# Patient Record
Sex: FEMALE | Race: OTHER | HISPANIC OR LATINO | URBAN - METROPOLITAN AREA
[De-identification: names, ages, dates, MRNs, and addresses within clinical notes are randomized per-mention and may not be internally consistent; named-entity substitution may affect disease eponyms.]

---

## 2023-08-17 RX ORDER — CEPHALEXIN 500 MG
1 CAPSULE ORAL
Refills: 0 | DISCHARGE
Start: 2023-08-17

## 2023-08-18 ENCOUNTER — INPATIENT (INPATIENT)
Facility: HOSPITAL | Age: 48
LOS: 3 days | Discharge: HOME | DRG: 812 | End: 2023-08-22
Attending: INTERNAL MEDICINE | Admitting: INTERNAL MEDICINE
Payer: MEDICAID

## 2023-08-18 VITALS
DIASTOLIC BLOOD PRESSURE: 84 MMHG | SYSTOLIC BLOOD PRESSURE: 146 MMHG | HEART RATE: 75 BPM | OXYGEN SATURATION: 97 % | TEMPERATURE: 99 F | RESPIRATION RATE: 20 BRPM

## 2023-08-18 DIAGNOSIS — D64.9 ANEMIA, UNSPECIFIED: ICD-10-CM

## 2023-08-18 LAB
ALBUMIN SERPL ELPH-MCNC: 4.4 G/DL — SIGNIFICANT CHANGE UP (ref 3.3–5)
ALP SERPL-CCNC: 53 U/L — SIGNIFICANT CHANGE UP (ref 40–120)
ALT FLD-CCNC: 9 U/L — LOW (ref 10–45)
ANION GAP SERPL CALC-SCNC: 13 MMOL/L — SIGNIFICANT CHANGE UP (ref 5–17)
APTT BLD: 26.7 SEC — SIGNIFICANT CHANGE UP (ref 24.5–35.6)
AST SERPL-CCNC: 10 U/L — SIGNIFICANT CHANGE UP (ref 10–40)
BASOPHILS # BLD AUTO: 0 K/UL — SIGNIFICANT CHANGE UP (ref 0–0.2)
BASOPHILS NFR BLD AUTO: 0 % — SIGNIFICANT CHANGE UP (ref 0–2)
BILIRUB SERPL-MCNC: 0.2 MG/DL — SIGNIFICANT CHANGE UP (ref 0.2–1.2)
BLD GP AB SCN SERPL QL: NEGATIVE — SIGNIFICANT CHANGE UP
BUN SERPL-MCNC: 8 MG/DL — SIGNIFICANT CHANGE UP (ref 7–23)
CALCIUM SERPL-MCNC: 8.9 MG/DL — SIGNIFICANT CHANGE UP (ref 8.4–10.5)
CHLORIDE SERPL-SCNC: 105 MMOL/L — SIGNIFICANT CHANGE UP (ref 96–108)
CO2 SERPL-SCNC: 22 MMOL/L — SIGNIFICANT CHANGE UP (ref 22–31)
CREAT SERPL-MCNC: 0.54 MG/DL — SIGNIFICANT CHANGE UP (ref 0.5–1.3)
EGFR: 115 ML/MIN/1.73M2 — SIGNIFICANT CHANGE UP
EOSINOPHIL # BLD AUTO: 0.07 K/UL — SIGNIFICANT CHANGE UP (ref 0–0.5)
EOSINOPHIL NFR BLD AUTO: 0.8 % — SIGNIFICANT CHANGE UP (ref 0–6)
GAS PNL BLDV: SIGNIFICANT CHANGE UP
GLUCOSE SERPL-MCNC: 96 MG/DL — SIGNIFICANT CHANGE UP (ref 70–99)
HCG SERPL-ACNC: <2 MIU/ML — SIGNIFICANT CHANGE UP
HCT VFR BLD CALC: 18.5 % — CRITICAL LOW (ref 34.5–45)
HCT VFR BLD CALC: 21.6 % — LOW (ref 34.5–45)
HGB BLD-MCNC: 4.6 G/DL — CRITICAL LOW (ref 11.5–15.5)
HGB BLD-MCNC: 5.9 G/DL — CRITICAL LOW (ref 11.5–15.5)
INR BLD: 1.05 RATIO — SIGNIFICANT CHANGE UP (ref 0.85–1.18)
LDH SERPL L TO P-CCNC: 196 U/L — SIGNIFICANT CHANGE UP (ref 50–242)
LYMPHOCYTES # BLD AUTO: 2.12 K/UL — SIGNIFICANT CHANGE UP (ref 1–3.3)
LYMPHOCYTES # BLD AUTO: 23.4 % — SIGNIFICANT CHANGE UP (ref 13–44)
MCHC RBC-ENTMCNC: 14 PG — LOW (ref 27–34)
MCHC RBC-ENTMCNC: 16.6 PG — LOW (ref 27–34)
MCHC RBC-ENTMCNC: 24.9 GM/DL — LOW (ref 32–36)
MCHC RBC-ENTMCNC: 27.3 GM/DL — LOW (ref 32–36)
MCV RBC AUTO: 56.4 FL — LOW (ref 80–100)
MCV RBC AUTO: 60.7 FL — LOW (ref 80–100)
MONOCYTES # BLD AUTO: 0.53 K/UL — SIGNIFICANT CHANGE UP (ref 0–0.9)
MONOCYTES NFR BLD AUTO: 5.8 % — SIGNIFICANT CHANGE UP (ref 2–14)
NEUTROPHILS # BLD AUTO: 6.34 K/UL — SIGNIFICANT CHANGE UP (ref 1.8–7.4)
NEUTROPHILS NFR BLD AUTO: 70 % — SIGNIFICANT CHANGE UP (ref 43–77)
NRBC # BLD: 0 /100 WBCS — SIGNIFICANT CHANGE UP (ref 0–0)
NT-PROBNP SERPL-SCNC: 58 PG/ML — SIGNIFICANT CHANGE UP (ref 0–300)
OB PNL STL: NEGATIVE — SIGNIFICANT CHANGE UP
PLATELET # BLD AUTO: 197 K/UL — SIGNIFICANT CHANGE UP (ref 150–400)
PLATELET # BLD AUTO: 267 K/UL — SIGNIFICANT CHANGE UP (ref 150–400)
POTASSIUM SERPL-MCNC: 4.2 MMOL/L — SIGNIFICANT CHANGE UP (ref 3.5–5.3)
POTASSIUM SERPL-SCNC: 4.2 MMOL/L — SIGNIFICANT CHANGE UP (ref 3.5–5.3)
PROT SERPL-MCNC: 7.6 G/DL — SIGNIFICANT CHANGE UP (ref 6–8.3)
PROTHROM AB SERPL-ACNC: 11 SEC — SIGNIFICANT CHANGE UP (ref 9.5–13)
RBC # BLD: 3.28 M/UL — LOW (ref 3.8–5.2)
RBC # BLD: 3.43 M/UL — LOW (ref 3.8–5.2)
RBC # BLD: 3.56 M/UL — LOW (ref 3.8–5.2)
RBC # FLD: 21.4 % — HIGH (ref 10.3–14.5)
RBC # FLD: 26.8 % — HIGH (ref 10.3–14.5)
RETICS #: 39.1 K/UL — SIGNIFICANT CHANGE UP (ref 25–125)
RETICS/RBC NFR: 1.1 % — SIGNIFICANT CHANGE UP (ref 0.5–2.5)
RH IG SCN BLD-IMP: POSITIVE — SIGNIFICANT CHANGE UP
SODIUM SERPL-SCNC: 140 MMOL/L — SIGNIFICANT CHANGE UP (ref 135–145)
TROPONIN T, HIGH SENSITIVITY RESULT: <6 NG/L — SIGNIFICANT CHANGE UP (ref 0–51)
WBC # BLD: 7.7 K/UL — SIGNIFICANT CHANGE UP (ref 3.8–10.5)
WBC # BLD: 9.06 K/UL — SIGNIFICANT CHANGE UP (ref 3.8–10.5)
WBC # FLD AUTO: 7.7 K/UL — SIGNIFICANT CHANGE UP (ref 3.8–10.5)
WBC # FLD AUTO: 9.06 K/UL — SIGNIFICANT CHANGE UP (ref 3.8–10.5)

## 2023-08-18 PROCEDURE — 71045 X-RAY EXAM CHEST 1 VIEW: CPT | Mod: 26

## 2023-08-18 PROCEDURE — 99232 SBSQ HOSP IP/OBS MODERATE 35: CPT

## 2023-08-18 PROCEDURE — 86078 PHYS BLOOD BANK SERV REACTJ: CPT

## 2023-08-18 PROCEDURE — 99254 IP/OBS CNSLTJ NEW/EST MOD 60: CPT | Mod: GC

## 2023-08-18 PROCEDURE — 99291 CRITICAL CARE FIRST HOUR: CPT

## 2023-08-18 RX ORDER — SENNA PLUS 8.6 MG/1
2 TABLET ORAL AT BEDTIME
Refills: 0 | Status: DISCONTINUED | OUTPATIENT
Start: 2023-08-18 | End: 2023-08-22

## 2023-08-18 RX ORDER — POLYETHYLENE GLYCOL 3350 17 G/17G
17 POWDER, FOR SOLUTION ORAL
Refills: 0 | Status: DISCONTINUED | OUTPATIENT
Start: 2023-08-18 | End: 2023-08-22

## 2023-08-18 RX ORDER — NAFCILLIN 10 G/100ML
1000 INJECTION, POWDER, FOR SOLUTION INTRAVENOUS ONCE
Refills: 0 | Status: COMPLETED | OUTPATIENT
Start: 2023-08-18 | End: 2023-08-18

## 2023-08-18 RX ORDER — NAFCILLIN 10 G/100ML
INJECTION, POWDER, FOR SOLUTION INTRAVENOUS
Refills: 0 | Status: DISCONTINUED | OUTPATIENT
Start: 2023-08-18 | End: 2023-08-19

## 2023-08-18 RX ORDER — NAFCILLIN 10 G/100ML
1000 INJECTION, POWDER, FOR SOLUTION INTRAVENOUS EVERY 6 HOURS
Refills: 0 | Status: DISCONTINUED | OUTPATIENT
Start: 2023-08-19 | End: 2023-08-19

## 2023-08-18 RX ORDER — ACETAMINOPHEN 500 MG
1000 TABLET ORAL ONCE
Refills: 0 | Status: COMPLETED | OUTPATIENT
Start: 2023-08-18 | End: 2023-08-18

## 2023-08-18 RX ADMIN — Medication 400 MILLIGRAM(S): at 21:03

## 2023-08-18 RX ADMIN — NAFCILLIN 100 MILLIGRAM(S): 10 INJECTION, POWDER, FOR SOLUTION INTRAVENOUS at 23:02

## 2023-08-18 NOTE — ED ADULT NURSE NOTE - CAS EDP DISCH DISPOSITION ADMI
[facility] - [facility]

## 2023-08-18 NOTE — CONSULT NOTE ADULT - ATTENDING COMMENTS
Anemia  Doubt but cannot rule out occult gi bleeding  Additional blood testing as ordered  Consideration for EGD colonoscopy, inpatient vs outpatient   Will followup

## 2023-08-18 NOTE — H&P ADULT - ASSESSMENT
Anemia Transfuse prn   GI consult appreciated   Anemia work up   Hem Eval        F with PMH chronic anemia (dx at age 14; on iron supplements), chronic intermittent constipation, gastric sleeve (done 2020) who presents to the ED after being referred to the ED for acute worsening of chronic anemia.         Anemia Transfuse prn   GI consult appreciated   Anemia work up   vit B12, Folate  levels   Hem Eval     Follicultiits Gyn consulted   recommend Dicloxacillin 500mg QID x7 days

## 2023-08-18 NOTE — ED PROVIDER NOTE - OBJECTIVE STATEMENT
Patient is a 48 y/o woman with s/p gastric sleeve bariatric surgery 3 years ago, with a history of anemia, presenting with 1 month of fatigue, drowsiness, shortness of breath and found to have anemia at her PCP. She has been constipated for about a month, and has had very dark stool for the past few days. Her LMP was 7/31 and she noticed darker blood than usual, she changed her pad 4-5 times per day, which is normal for her. She additionally has a history of nausea and vomiting since her bariatric surgery, most recently yesterday when she vomited 3x, but she has not noticed blood. It takes her about 2 minutes to catch her breath when she feels short of breath, and it occurs when she is walking. She felt like she had a fever last night. She has baseline abdominal pain since the surgery. For the past week she has had a bump on her right labia majora, which has gotten red, painful, hard, and bigger over the past week. She saw her PCP yesterday and was given Keflex 500mg four times a day, which she took one dose of. She denies any recent falls, dizziness, lightheadedness, changes to vision, changes to hearing, chest pain, sick contacts, wheezing, changes to urination, and swelling in the legs.

## 2023-08-18 NOTE — PATIENT PROFILE ADULT - FUNCTIONAL ASSESSMENT - BASIC MOBILITY 6.
4-calculated by average/Not able to assess (calculate score using [facility] averaging method)  4-calculated by average/Not able to assess (calculate score using [facility] averaging method)  4-calculated by average/Not able to assess (calculate score using [facility] averaging method)

## 2023-08-18 NOTE — CHART NOTE - NSCHARTNOTEFT_GEN_A_CORE
46 y/o F with PMH chronic anemia (dx at age 14; on iron supplements), chronic intermittent constipation, gastric sleeve (done 2020) who presents to the ED after being referred to the ED for acute worsening of chronic anemia. Hgb 4.6 on arrival, s/p 1 U prbc.     Notified by RN, patient with rectal T 100.4F. Patient seen and examined, endorsing intermittent chills x few days did not check temperature. Otherwise VSS. Patient A&O x 4 denies CP, SOB, back pain, n/v or any other complaints at this time. Patient with vulvar folliculitis 48 y/o F with PMH chronic anemia (dx at age 14; on iron supplements), chronic intermittent constipation, gastric sleeve (done 2020) who presents to the ED after being referred to the ED for acute worsening of chronic anemia. Hgb 4.6 on arrival, s/p 1 U prbc.     Notified by RN, patient with rectal T 100.4F. Patient seen and examined, endorsing intermittent chills x few days did not check temperature. Otherwise VSS. Patient A&O x 4 denies CP, SOB, back pain, n/v or any other complaints at this time. Patient with vulvar folliculitis

## 2023-08-18 NOTE — ED PROVIDER NOTE - ATTENDING CONTRIBUTION TO CARE
This was a shared visit with the KIM. I reviewed and verified the documentation and independently performed the documented:     History, Exam and Medical Decision Making.     Attending Contribution to Care: I have personally provided the amount of critical care time documented below concurrently with the resident/fellow.  This time excludes time spent on separate procedures and time spent teaching. I have reviewed the resident’s / fellow’s documentation and I agree with the history, exam, and assessment and plan of care.     Critical Care Time Spent (min) Must be 30 or more minutes to qualify: 53.    46yo , LMP 2023 hx of gastric sleeve and known hx of anemia presents w/ 1 month of fatigue drowsiness, SOB, and was found to be anemic, pt states that she has a lesion on the R labia, she reports that she has had darker stool for a few days, no diarrhea, no brbpr, pt w/ no cp, but reports fatigue. no prior hx of blood transfusion, pt w/ no fevers, no chills.   On exam, pt is awake and alert in no distress,   used  Jean-Pierre [id number]  On exam, I have reviewed the triage vital signs.    Const: appearing stated age, no acute distress  Head: atraumatic, normocephalic  Eyes: no conjunctival injection and no scleral icterus, conjunctival pallor   ENMT: Atraumatic external nose and ears, Moist mucus membranes  Neck: Symmetric, trachea midline, no c spine tenderness  BACK: no bruising, no midline t/l spine tenderness  CVS: +S1/S2, dorsalis pedis/radial pulse 2+ bilaterally  RESP: Unlabored respiratory effort, Clear to auscultation bilaterally  GI: Nontender/Nondistended, soft abdomen  : R labia w/ a 1 cm well circumscribed lesions w/ no active drainage nor purulence, Chaperone Sabas   MSK: Extremities w/o deformity or ttp   Psych: Awake, Alert, & Orientedx3;  Appropriate mood and affect, cooperative  Plan for labs transfusion and admission for anemia workup and further management This was a shared visit with the KIM. I reviewed and verified the documentation and independently performed the documented:     History, Exam and Medical Decision Making.     Attending Contribution to Care: I have personally provided the amount of critical care time documented below concurrently with the resident/fellow.  This time excludes time spent on separate procedures and time spent teaching. I have reviewed the resident’s / fellow’s documentation and I agree with the history, exam, and assessment and plan of care.     Critical Care Time Spent (min) Must be 30 or more minutes to qualify: 53.    48yo , LMP 2023 hx of gastric sleeve and known hx of anemia presents w/ 1 month of fatigue drowsiness, SOB, and was found to be anemic, pt states that she has a lesion on the R labia, she reports that she has had darker stool for a few days, no diarrhea, no brbpr, pt w/ no cp, but reports fatigue. no prior hx of blood transfusion, pt w/ no fevers, no chills.   On exam, pt is awake and alert in no distress,   used  Jean-Pierre [id number]  On exam, I have reviewed the triage vital signs.    Const: appearing stated age, no acute distress  Head: atraumatic, normocephalic  Eyes: no conjunctival injection and no scleral icterus, conjunctival pallor   ENMT: Atraumatic external nose and ears, Moist mucus membranes  Neck: Symmetric, trachea midline, no c spine tenderness  BACK: no bruising, no midline t/l spine tenderness  CVS: +S1/S2, dorsalis pedis/radial pulse 2+ bilaterally  RESP: Unlabored respiratory effort, Clear to auscultation bilaterally  GI: Nontender/Nondistended, soft abdomen  : R labia w/ a 1 cm well circumscribed lesions w/ no active drainage nor purulence, Chaperone Sabas   MSK: Extremities w/o deformity or ttp   Psych: Awake, Alert, & Orientedx3;  Appropriate mood and affect, cooperative  Plan for labs transfusion and admission for anemia workup and further management This was a shared visit with the KIM. I reviewed and verified the documentation and independently performed the documented:     History, Exam and Medical Decision Making.     Attending Contribution to Care: I have personally provided the amount of critical care time documented below concurrently with the resident/fellow.  This time excludes time spent on separate procedures and time spent teaching. I have reviewed the resident’s / fellow’s documentation and I agree with the history, exam, and assessment and plan of care.     Critical Care Time Spent (min) Must be 30 or more minutes to qualify: 53.    46yo , LMP 2023 hx of gastric sleeve and known hx of anemia presents w/ 1 month of fatigue drowsiness, SOB, and was found to be anemic, pt states that she has a lesion on the R labia, she reports that she has had darker stool for a few days, no diarrhea, no brbpr, pt w/ no cp, but reports fatigue. no prior hx of blood transfusion, pt w/ no fevers, no chills.   On exam, pt is awake and alert in no distress,   used  Jean-Pierre [id number]  On exam, I have reviewed the triage vital signs.    Const: appearing stated age, no acute distress  Head: atraumatic, normocephalic  Eyes: no conjunctival injection and no scleral icterus, conjunctival pallor   ENMT: Atraumatic external nose and ears, Moist mucus membranes  Neck: Symmetric, trachea midline, no c spine tenderness  BACK: no bruising, no midline t/l spine tenderness  CVS: +S1/S2, dorsalis pedis/radial pulse 2+ bilaterally  RESP: Unlabored respiratory effort, Clear to auscultation bilaterally  GI: Nontender/Nondistended, soft abdomen  : R labia w/ a 1 cm well circumscribed lesions w/ no active drainage nor purulence, Chaperone Sabas   MSK: Extremities w/o deformity or ttp   Psych: Awake, Alert, & Orientedx3;  Appropriate mood and affect, cooperative  Plan for labs transfusion and admission for anemia workup and further management

## 2023-08-18 NOTE — H&P ADULT - HISTORY OF PRESENT ILLNESS
F with PMH chronic anemia (dx at age 14; on iron supplements), chronic intermittent constipation, gastric sleeve p/w  worsening of chronic anemia. Reports 1 month of fatigue, drowsiness, shortness of breath; had OP blood work done by PCP and was told to come to the ED for further eval. Report intermittent vomiting 2/2 known food triggers such as fruits, watermelon, fatty/greasy foods; last vomited yesterday after eating watermelon

## 2023-08-18 NOTE — ED PROVIDER NOTE - PHYSICAL EXAMINATION
T(C): 37.1 (08-18-23 @ 11:28), Max: 37.1 (08-18-23 @ 11:28)  HR: 75 (08-18-23 @ 11:28) (75 - 75)  BP: 146/84 (08-18-23 @ 11:28) (146/84 - 146/84)  RR: 20 (08-18-23 @ 11:28) (20 - 20)  SpO2: 97% (08-18-23 @ 11:28) (97% - 97%)    Constitutional: Sitting comfortably in bed, no apparent distress  Eyes: PERRLA and symmetric, EOMI, No conjunctival or scleral injection, non-icteric  HEENT: Atraumatic, normocephalic. Moist mucous membranes. Normal dentition, no pharyngeal injection or exudates.  Respiratory: Clear to auscultation bilaterally, no respiratory distress. No accessory muscle usage. No wheezes, rales or rhonchi.  Cardiovascular: Regular rate and rhythm, +S1 S2, no murmurs, rubs, or gallops. no JVD; no peripheral edema  Abdominal: +BS. Soft, tender to palpation in epigastric region, nondistended. No rebound tenderness, no guarding.   Extremities: 2+ peripheral pulses b/l, no peripheral edema, full range of motion.   Genitourinary: erythematous, firm, tender, 1 cm lump on the right labia.  Skin: No rashes or bruises.

## 2023-08-18 NOTE — ED PROVIDER NOTE - NS ED ROS FT
Constitutional: +Fatigue, +Fever. No chills, night sweats, or changes in weight.  HEENT: +Headache. No vision changes, no eye pain/redness/discharge; no hearing changes, tinnitus, vertigo; no rhinorrhea; no sore throat, no difficulty swallowing  Neck: No neck pain, stiffness, or swelling  Respiratory: +Shortness of breath. No cough, or wheezing  Cardiovascular: No chest pain or palpitations  Gastrointestinal: +Nausea, vomiting, constipation, dark stools, abdominal pain.   Genitourinary: +Red, painful, hard lump on right labia majora. No dysuria, urinary frequency, hematuria, or incontinence  Extremities: No leg swelling  Skin: No rashes

## 2023-08-18 NOTE — CONSULT NOTE ADULT - ASSESSMENT
48yo , LMP 2023, who presents to the ED after being referred to the ED by her PMD for anemia of unclear etiology (no reported hx of heavy vaginal bleeding). GYN consulted for likely folliculitis. Patient additionally afebrile w/o any leukocytosis.     Recommendation is as follows:  - Sitz baths TID for w/ each session ~20min  - Discontinue Cephalexin, start Dicloxacillin 500mg QID x7 days   - Discontinue shaving of vulva     José Miguel Noel  PGY-2, Obstetrics & Gynecology     d/w Dr. PRITESH Johnson     46yo , LMP 2023, who presents to the ED after being referred to the ED by her PMD for anemia of unclear etiology (no reported hx of heavy vaginal bleeding). GYN consulted for likely folliculitis. Patient additionally afebrile w/o any leukocytosis.     Recommendation is as follows:  - Sitz baths TID for w/ each session ~20min  - Discontinue Cephalexin, start Dicloxacillin 500mg QID x7 days   - Discontinue shaving of vulva     José Miguel Noel  PGY-2, Obstetrics & Gynecology     d/w Dr. PRITESH Johnson

## 2023-08-18 NOTE — ED ADULT NURSE NOTE - FINAL NURSING ELECTRONIC SIGNATURE
Isotretinoin Pregnancy And Lactation Text: This medication is Pregnancy Category X and is considered extremely dangerous during pregnancy. It is unknown if it is excreted in breast milk. 18-Aug-2023 21:03

## 2023-08-18 NOTE — CHART NOTE - NSCHARTNOTEFT_GEN_A_CORE
MEDICINE NP    CLIFF BLUM  47y Female    Patient is a 47y old  Female who presents with a chief complaint of Anemia (18 Aug 2023 16:27)         > Event Summary:   Notified by RN, Patient c/o feverish fever T-100.4 rectally.     RN reports patient due for 2nd Unit of PRBC and c/o feverish and chills, oral temp obtained T-99.9, repeated rectally T-100.4.  Patient seen at bedside w/ use of Language  # [id number] (Mohawk).  Patient AOX4, reports feverish and chills from prior to hospitalization x 1 week with noted Right vulva "lump";  states she did not check temperature.    Patient denies sob, chest pain, rash, itching, palpitations, back pain.    Exam: Lungs CTA, S1,S2 RRR.  Right vulva w/ fluctuant closed nodule.  No rash or hives.          -Vital Signs Last 24 Hrs  T(C): 37.3 (18 Aug 2023 21:34), Max: 38 (18 Aug 2023 20:29)  T(F): 99.2 (18 Aug 2023 21:34), Max: 100.4 (18 Aug 2023 20:29)  HR: 89 (18 Aug 2023 21:34) (59 - 92)  BP: 132/77 (18 Aug 2023 21:34) (111/69 - 146/84)  BP(mean): 98 (18 Aug 2023 16:27) (98 - 98)  RR: 18 (18 Aug 2023 21:34) (18 - 20)  SpO2: 100% (18 Aug 2023 21:34) (97% - 100%)    Parameters below as of 18 Aug 2023 21:34  Patient On (Oxygen Delivery Method): room air        > Assess & Plan:  HPI:  46 y/o woman with s/p Gastric sleeve Bariatric surgery 3 years ago, with a history of Anemia  (dx at age 14; on iron supplements), , presenting with 1 month of fatigue, drowsiness, shortness of breath and found to have anemia at her PCP and sent to the ED.  Additionally she felt like she had a fever last night, and for the past week she has had a bump on her right labia majora, which has gotten red, painful, hard, and bigger over the past week. She saw her PCP yesterday and was given Keflex.  Admitted with Symptomatic Anemia.  Now with Fever.    1. Fever -r/o Post-transfusion reaction                EKATERINA Zhou-BC  Medicine Department MEDICINE NP    CLIFF BLUM  47y Female    Patient is a 47y old  Female who presents with a chief complaint of Anemia (18 Aug 2023 16:27)         > Event Summary:   Notified by RN, Patient c/o feverish fever T-100.4 rectally.     RN reports patient due for 2nd Unit of PRBC and c/o feverish and chills, oral temp obtained T-99.9, repeated rectally T-100.4.  Patient seen at bedside w/ use of Language  # [id number] (Bengali).  Patient AOX4, reports feverish and chills from prior to hospitalization x 1 week with noted Right vulva "lump";  states she did not check temperature.    Patient denies sob, chest pain, rash, itching, palpitations, back pain.    Exam: Lungs CTA, S1,S2 RRR.  Right vulva w/ fluctuant closed nodule.  No rash or hives.          -Vital Signs Last 24 Hrs  T(C): 37.3 (18 Aug 2023 21:34), Max: 38 (18 Aug 2023 20:29)  T(F): 99.2 (18 Aug 2023 21:34), Max: 100.4 (18 Aug 2023 20:29)  HR: 89 (18 Aug 2023 21:34) (59 - 92)  BP: 132/77 (18 Aug 2023 21:34) (111/69 - 146/84)  BP(mean): 98 (18 Aug 2023 16:27) (98 - 98)  RR: 18 (18 Aug 2023 21:34) (18 - 20)  SpO2: 100% (18 Aug 2023 21:34) (97% - 100%)    Parameters below as of 18 Aug 2023 21:34  Patient On (Oxygen Delivery Method): room air        > Assess & Plan:  HPI:  46 y/o woman with s/p Gastric sleeve Bariatric surgery 3 years ago, with a history of Anemia  (dx at age 14; on iron supplements), , presenting with 1 month of fatigue, drowsiness, shortness of breath and found to have anemia at her PCP and sent to the ED.  Additionally she felt like she had a fever last night, and for the past week she has had a bump on her right labia majora, which has gotten red, painful, hard, and bigger over the past week. She saw her PCP yesterday and was given Keflex.  Admitted with Symptomatic Anemia.  Now with Fever.    1. Fever -r/o Post-transfusion reaction                EKATERINA Zhou-BC  Medicine Department MEDICINE NP    CLIFF BLUM  47y Female    Patient is a 47y old  Female who presents with a chief complaint of Anemia (18 Aug 2023 16:27)         > Event Summary:   Notified by RN, Patient c/o feverish fever T-100.4 rectally.     RN reports patient due for 2nd Unit of PRBC and c/o feverish and chills, oral temp obtained T-99.9, repeated rectally T-100.4.  Patient seen at bedside w/ use of Language  # [id number] (Kazakh).  Patient AOX4, reports feverish and chills from prior to hospitalization x 1 week with noted Right vulva "lump";  states she did not check temperature.    Patient denies sob, chest pain, rash, itching, palpitations, back pain.    Exam: Lungs CTA, S1,S2 RRR.  Right vulva w/ fluctuant closed nodule.  No rash or hives.          -Vital Signs Last 24 Hrs  T(C): 37.3 (18 Aug 2023 21:34), Max: 38 (18 Aug 2023 20:29)  T(F): 99.2 (18 Aug 2023 21:34), Max: 100.4 (18 Aug 2023 20:29)  HR: 89 (18 Aug 2023 21:34) (59 - 92)  BP: 132/77 (18 Aug 2023 21:34) (111/69 - 146/84)  BP(mean): 98 (18 Aug 2023 16:27) (98 - 98)  RR: 18 (18 Aug 2023 21:34) (18 - 20)  SpO2: 100% (18 Aug 2023 21:34) (97% - 100%)    Parameters below as of 18 Aug 2023 21:34  Patient On (Oxygen Delivery Method): room air        > Assess & Plan:  HPI:  46 y/o woman with s/p Gastric sleeve Bariatric surgery 3 years ago, with a history of Anemia  (dx at age 14; on iron supplements), , presenting with 1 month of fatigue, drowsiness, shortness of breath and found to have anemia at her PCP and sent to the ED.  Additionally she felt like she had a fever last night, and for the past week she has had a bump on her right labia majora, which has gotten red, painful, hard, and bigger over the past week. She saw her PCP yesterday and was given Keflex.  Admitted with Symptomatic Anemia.  Now with Fever.    1. Fever -r/o Post-transfusion reaction                EKATERINA Zhou-BC  Medicine Department MEDICINE NP    CLIFF BLUM  47y Female    Patient is a 47y old  Female who presents with a chief complaint of Anemia (18 Aug 2023 16:27)         > Event Summary:   Notified by RN, Patient c/o feverish fever T-100.4 rectally.     RN reports patient due for 2nd Unit of PRBC and c/o feverish and chills, oral temp obtained T-99.9, repeated rectally T-100.4.  Patient seen at bedside w/ use of Language  # [id number] (Occitan).  Patient AOX4, reports feverish and chills from prior to hospitalization x 1 week with noted Right vulva "lump";  states she did not check temperature.    Patient denies sob, chest pain, rash, itching, palpitations, back pain.    Exam: Lungs CTA, S1,S2 RRR.  Right vulva w/ fluctuant closed nodule.  No rash or hives.          -Vital Signs Last 24 Hrs  T(C): 37.3 (18 Aug 2023 21:34), Max: 38 (18 Aug 2023 20:29)  T(F): 99.2 (18 Aug 2023 21:34), Max: 100.4 (18 Aug 2023 20:29)  HR: 89 (18 Aug 2023 21:34) (59 - 92)  BP: 132/77 (18 Aug 2023 21:34) (111/69 - 146/84)  BP(mean): 98 (18 Aug 2023 16:27) (98 - 98)  RR: 18 (18 Aug 2023 21:34) (18 - 20)  SpO2: 100% (18 Aug 2023 21:34) (97% - 100%)    Parameters below as of 18 Aug 2023 21:34  Patient On (Oxygen Delivery Method): room air        > Assess & Plan:  HPI:  48 y/o woman with s/p Gastric sleeve Bariatric surgery 3 years ago, with a history of Anemia  (dx at age 14; on iron supplements), , presenting with 1 month of fatigue, drowsiness, shortness of breath and found to have anemia at her PCP and sent to the ED.  Additionally she felt like she had a fever last night, and for the past week she has had a bump on her right labia majora, which has gotten red, painful, hard, and bigger over the past week. She saw her PCP yesterday and was given Keflex.  Admitted with Symptomatic Anemia.  Now with Fever.    1. Fever - Concern for ?Post-transfusion reaction vs Infectious Process  -Post-transfusion reaction given >1* degree Celsius change in temp - Will send CBC, Type & Screen  -Infectious process likely from vulva -Nafcillin QID ordered per GYN   -Tylenol /Cooling measures prn  -BCX x2, UA ordered  -CXR- neg    2. Anemia  -F/u STAT CBC and T&S above  -Transfuse 2nd Unit when available  -Maintain hemoglobin >/= 7.0 per GI   -GI following  -Heme consulted       -D/w Dr. Blackwell, req CTAP per GI.    -Endorsed to floor NP       Yudi Carrion, EKATERINA-BC  Medicine Department  #[id number] MEDICINE NP    CLIFF BLUM  47y Female    Patient is a 47y old  Female who presents with a chief complaint of Anemia (18 Aug 2023 16:27)         > Event Summary:   Notified by RN, Patient c/o feverish fever T-100.4 rectally.     RN reports patient due for 2nd Unit of PRBC and c/o feverish and chills, oral temp obtained T-99.9, repeated rectally T-100.4.  Patient seen at bedside w/ use of Language  # [id number] (Uzbek).  Patient AOX4, reports feverish and chills from prior to hospitalization x 1 week with noted Right vulva "lump";  states she did not check temperature.    Patient denies sob, chest pain, rash, itching, palpitations, back pain.    Exam: Lungs CTA, S1,S2 RRR.  Right vulva w/ fluctuant closed nodule.  No rash or hives.          -Vital Signs Last 24 Hrs  T(C): 37.3 (18 Aug 2023 21:34), Max: 38 (18 Aug 2023 20:29)  T(F): 99.2 (18 Aug 2023 21:34), Max: 100.4 (18 Aug 2023 20:29)  HR: 89 (18 Aug 2023 21:34) (59 - 92)  BP: 132/77 (18 Aug 2023 21:34) (111/69 - 146/84)  BP(mean): 98 (18 Aug 2023 16:27) (98 - 98)  RR: 18 (18 Aug 2023 21:34) (18 - 20)  SpO2: 100% (18 Aug 2023 21:34) (97% - 100%)    Parameters below as of 18 Aug 2023 21:34  Patient On (Oxygen Delivery Method): room air        > Assess & Plan:  HPI:  48 y/o woman with s/p Gastric sleeve Bariatric surgery 3 years ago, with a history of Anemia  (dx at age 14; on iron supplements), , presenting with 1 month of fatigue, drowsiness, shortness of breath and found to have anemia at her PCP and sent to the ED.  Additionally she felt like she had a fever last night, and for the past week she has had a bump on her right labia majora, which has gotten red, painful, hard, and bigger over the past week. She saw her PCP yesterday and was given Keflex.  Admitted with Symptomatic Anemia.  Now with Fever.    1. Fever - Concern for ?Post-transfusion reaction vs Infectious Process  -Post-transfusion reaction given >1* degree Celsius change in temp - Will send CBC, Type & Screen  -Infectious process likely from vulva -Nafcillin QID ordered per GYN   -Tylenol /Cooling measures prn  -BCX x2, UA ordered  -CXR- neg    2. Anemia  -F/u STAT CBC and T&S above  -Transfuse 2nd Unit when available  -Maintain hemoglobin >/= 7.0 per GI   -GI following  -Heme consulted       -D/w Dr. Blackwell, req CTAP per GI.    -Endorsed to floor NP       Yudi Carrion, EKATERINA-BC  Medicine Department  #[id number] MEDICINE NP    CLIFF BLUM  47y Female    Patient is a 47y old  Female who presents with a chief complaint of Anemia (18 Aug 2023 16:27)         > Event Summary:   Notified by RN, Patient c/o feverish fever T-100.4 rectally.     RN reports patient due for 2nd Unit of PRBC and c/o feverish and chills, oral temp obtained T-99.9, repeated rectally T-100.4.  Patient seen at bedside w/ use of Language  # [id number] (Uzbek).  Patient AOX4, reports feverish and chills from prior to hospitalization x 1 week with noted Right vulva "lump";  states she did not check temperature.    Patient denies sob, chest pain, rash, itching, palpitations, back pain.    Exam: Lungs CTA, S1,S2 RRR.  Right vulva w/ fluctuant closed nodule.  No rash or hives.          -Vital Signs Last 24 Hrs  T(C): 37.3 (18 Aug 2023 21:34), Max: 38 (18 Aug 2023 20:29)  T(F): 99.2 (18 Aug 2023 21:34), Max: 100.4 (18 Aug 2023 20:29)  HR: 89 (18 Aug 2023 21:34) (59 - 92)  BP: 132/77 (18 Aug 2023 21:34) (111/69 - 146/84)  BP(mean): 98 (18 Aug 2023 16:27) (98 - 98)  RR: 18 (18 Aug 2023 21:34) (18 - 20)  SpO2: 100% (18 Aug 2023 21:34) (97% - 100%)    Parameters below as of 18 Aug 2023 21:34  Patient On (Oxygen Delivery Method): room air        > Assess & Plan:  HPI:  46 y/o woman with s/p Gastric sleeve Bariatric surgery 3 years ago, with a history of Anemia  (dx at age 14; on iron supplements), , presenting with 1 month of fatigue, drowsiness, shortness of breath and found to have anemia at her PCP and sent to the ED.  Additionally she felt like she had a fever last night, and for the past week she has had a bump on her right labia majora, which has gotten red, painful, hard, and bigger over the past week. She saw her PCP yesterday and was given Keflex.  Admitted with Symptomatic Anemia.  Now with Fever.    1. Fever - Concern for ?Post-transfusion reaction vs Infectious Process  -Post-transfusion reaction given >1* degree Celsius change in temp - Will send CBC, Type & Screen  -Infectious process likely from vulva -Nafcillin QID ordered per GYN   -Tylenol /Cooling measures prn  -BCX x2, UA ordered  -CXR- neg    2. Anemia  -F/u STAT CBC and T&S above  -Transfuse 2nd Unit when available  -Maintain hemoglobin >/= 7.0 per GI   -GI following  -Heme consulted       -D/w Dr. Blackwell, req CTAP per GI.    -Endorsed to floor NP       Yudi Carrion, EKATERINA-BC  Medicine Department  #[id number]

## 2023-08-18 NOTE — ED ADULT NURSE NOTE - OBJECTIVE STATEMENT
48 yo F pt PMHx of Anemia unknown cause p/w low HGB outpatient. 46 yo F pt PMHx of Anemia unknown cause p/w low HGB outpatient. 46 yo F pt PMHx gastric sleeve 3 years ago, Anemia unknown cause p/w low HGB outpatient and 1 month of fatigue, SOB. pt endorsing constipation xs 1 month with dark stool past few days and N/V yesterday NBNB. LMP 7/31 with darker blood than usual changed pad 3-4 times a day normal for her.  pt is A&Ox4, MAEW, abd soft non tender, skin warm dry intact/normal for race.  Pt denies headache, dizziness, chest pain, palpitations, cough, SOB, abdominal pain, n/v/d, urinary symptoms, fevers, chills, weakness at this time. 48 yo F pt PMHx gastric sleeve 3 years ago, Anemia unknown cause p/w low HGB outpatient and 1 month of fatigue, SOB. pt endorsing constipation xs 1 month with dark stool past few days and N/V yesterday NBNB. LMP 7/31 with darker blood than usual changed pad 3-4 times a day normal for her.  pt is A&Ox4, MAEW, abd soft non tender, skin warm dry intact/normal for race.  Pt denies headache, dizziness, chest pain, palpitations, cough, SOB, abdominal pain, n/v/d, urinary symptoms, fevers, chills, weakness at this time.

## 2023-08-18 NOTE — CONSULT NOTE ADULT - SUBJECTIVE AND OBJECTIVE BOX
CLIFF COLE  47y  Female [id number]  - # [id number], [place]    HPI: 48yo , LMP 2023, who presents to the ED after being referred to the ED for anemia found by her PCP of unclear etiology. GYN consulted for vulvar lesion of x1 week duration. Reports developing this bump x1 week ago that is painful. Was seen by her PMD x1 day ago for which she was started on Cephalexin 500mg for which she has taken x1 dose thus far. Has tried hot packs but not sitz baths. Denies any heavy or significant vaginal bleeding (denies a hx of heavy periods), fevers, or abnormal vaginal discharge. Of note, patient reports regularly shaving her vulva q3 days    Name of GYN Physician: Does not have one     POB:  x1 (), ToPx1    PGyn: Reports a having had a polypectomy at a clinic somewhere in [place]   - Denies any other GYN hx   MedHx: Denies  SurgHx: Sleeve gastrectomy  Meds: Vitamins  Allergies: NKDA  Social: Denies any tobacco use, drug use, or alcohol use       Vital Signs Last 24 Hrs  T(C): 37.1 (18 Aug 2023 11:28), Max: 37.1 (18 Aug 2023 11:28)  T(F): 98.8 (18 Aug 2023 11:28), Max: 98.8 (18 Aug 2023 11:28)  HR: 75 (18 Aug 2023 11:28) (75 - 75)  BP: 146/84 (18 Aug 2023 11:28) (146/84 - 146/84)  BP(mean): --  RR: 20 (18 Aug 2023 11:28) (20 - 20)  SpO2: 97% (18 Aug 2023 11:28) (97% - 97%)      Physical Exam:   General: Awake. Alert. NAD  Lungs: Unlabored breathing. No respiratory distress   Abd: Soft, non-tender, non-distended   (w/ ED tech chaperone Geraldine Kemp): Left vulva grossly normal. Right vulva w/ indurated lesion ~3cm in length and 2cm in width that comes to a pintpoint area. No warmth. No discharge or bleeding. No fluctuance appreciated. No involvement of the introitus or vagina w/ digital exam   Ext: No calf tenderness bilaterally    LABS:                              4.6    9.06  )-----------( 267      ( 18 Aug 2023 14:19 )             18.5         140  |  105  |  8   ----------------------------<  96  4.2   |  22  |  0.54    Ca    8.9      18 Aug 2023 14:19    TPro  7.6  /  Alb  4.4  /  TBili  0.2  /  DBili  x   /  AST  10  /  ALT  9<L>  /  AlkPhos  53      I&O's Detail    PT/INR - ( 18 Aug 2023 14:19 )   PT: 11.0 sec;   INR: 1.05 ratio         PTT - ( 18 Aug 2023 14:19 )  PTT:26.7 sec  Urinalysis Basic - ( 18 Aug 2023 14:19 )    Color: x / Appearance: x / SG: x / pH: x  Gluc: 96 mg/dL / Ketone: x  / Bili: x / Urobili: x   Blood: x / Protein: x / Nitrite: x   Leuk Esterase: x / RBC: x / WBC x   Sq Epi: x / Non Sq Epi: x / Bacteria: x        RADIOLOGY & ADDITIONAL STUDIES: CLIFF COLE  47y  Female [id number]  - # [id number], [place]    HPI: 46yo , LMP 2023, who presents to the ED after being referred to the ED for anemia found by her PCP of unclear etiology. GYN consulted for vulvar lesion of x1 week duration. Reports developing this bump x1 week ago that is painful. Was seen by her PMD x1 day ago for which she was started on Cephalexin 500mg for which she has taken x1 dose thus far. Has tried hot packs but not sitz baths. Denies any heavy or significant vaginal bleeding (denies a hx of heavy periods), fevers, or abnormal vaginal discharge. Of note, patient reports regularly shaving her vulva q3 days    Name of GYN Physician: Does not have one     POB:  x1 (), ToPx1    PGyn: Reports a having had a polypectomy at a clinic somewhere in [place]   - Denies any other GYN hx   MedHx: Denies  SurgHx: Sleeve gastrectomy  Meds: Vitamins  Allergies: NKDA  Social: Denies any tobacco use, drug use, or alcohol use       Vital Signs Last 24 Hrs  T(C): 37.1 (18 Aug 2023 11:28), Max: 37.1 (18 Aug 2023 11:28)  T(F): 98.8 (18 Aug 2023 11:28), Max: 98.8 (18 Aug 2023 11:28)  HR: 75 (18 Aug 2023 11:28) (75 - 75)  BP: 146/84 (18 Aug 2023 11:28) (146/84 - 146/84)  BP(mean): --  RR: 20 (18 Aug 2023 11:28) (20 - 20)  SpO2: 97% (18 Aug 2023 11:28) (97% - 97%)      Physical Exam:   General: Awake. Alert. NAD  Lungs: Unlabored breathing. No respiratory distress   Abd: Soft, non-tender, non-distended   (w/ ED tech chaperone Geraldine Kemp): Left vulva grossly normal. Right vulva w/ indurated lesion ~3cm in length and 2cm in width that comes to a pintpoint area. No warmth. No discharge or bleeding. No fluctuance appreciated. No involvement of the introitus or vagina w/ digital exam   Ext: No calf tenderness bilaterally    LABS:                              4.6    9.06  )-----------( 267      ( 18 Aug 2023 14:19 )             18.5         140  |  105  |  8   ----------------------------<  96  4.2   |  22  |  0.54    Ca    8.9      18 Aug 2023 14:19    TPro  7.6  /  Alb  4.4  /  TBili  0.2  /  DBili  x   /  AST  10  /  ALT  9<L>  /  AlkPhos  53      I&O's Detail    PT/INR - ( 18 Aug 2023 14:19 )   PT: 11.0 sec;   INR: 1.05 ratio         PTT - ( 18 Aug 2023 14:19 )  PTT:26.7 sec  Urinalysis Basic - ( 18 Aug 2023 14:19 )    Color: x / Appearance: x / SG: x / pH: x  Gluc: 96 mg/dL / Ketone: x  / Bili: x / Urobili: x   Blood: x / Protein: x / Nitrite: x   Leuk Esterase: x / RBC: x / WBC x   Sq Epi: x / Non Sq Epi: x / Bacteria: x        RADIOLOGY & ADDITIONAL STUDIES: CLIFF COLE  47y  Female [id number]  - # [id number], [place]    HPI: 48yo , LMP 2023, who presents to the ED after being referred to the ED for anemia found by her PCP of unclear etiology. GYN consulted for vulvar lesion of x1 week duration. Reports developing this bump x1 week ago that is painful. Was seen by her PMD x1 day ago for which she was started on Cephalexin 500mg for which she has taken x1 dose thus far. Has tried hot packs but not sitz baths. Denies any heavy or significant vaginal bleeding (denies a hx of heavy periods), fevers, or abnormal vaginal discharge. Of note, patient reports regularly shaving her vulva q3 days    Name of GYN Physician: Does not have one     POB:  x1 (), ToPx1    PGyn: Reports a having had a polypectomy at a clinic somewhere in [place]   - Denies any other GYN hx   MedHx: Denies  SurgHx: Sleeve gastrectomy  Meds: Vitamins  Allergies: NKDA  Social: Denies any tobacco use, drug use, or alcohol use       Vital Signs Last 24 Hrs  T(C): 37.1 (18 Aug 2023 11:28), Max: 37.1 (18 Aug 2023 11:28)  T(F): 98.8 (18 Aug 2023 11:28), Max: 98.8 (18 Aug 2023 11:28)  HR: 75 (18 Aug 2023 11:28) (75 - 75)  BP: 146/84 (18 Aug 2023 11:28) (146/84 - 146/84)  BP(mean): --  RR: 20 (18 Aug 2023 11:28) (20 - 20)  SpO2: 97% (18 Aug 2023 11:28) (97% - 97%)      Physical Exam:   General: Awake. Alert. NAD  Lungs: Unlabored breathing. No respiratory distress   Abd: Soft, non-tender, non-distended   (w/ ED tech chaperone Geraldine Kemp): Left vulva grossly normal. Right vulva w/ indurated lesion ~3cm in length and 2cm in width that comes to a pintpoint area. No warmth. No discharge or bleeding. No fluctuance appreciated. No involvement of the introitus or vagina w/ digital exam   Ext: No calf tenderness bilaterally    LABS:                              4.6    9.06  )-----------( 267      ( 18 Aug 2023 14:19 )             18.5         140  |  105  |  8   ----------------------------<  96  4.2   |  22  |  0.54    Ca    8.9      18 Aug 2023 14:19    TPro  7.6  /  Alb  4.4  /  TBili  0.2  /  DBili  x   /  AST  10  /  ALT  9<L>  /  AlkPhos  53      I&O's Detail    PT/INR - ( 18 Aug 2023 14:19 )   PT: 11.0 sec;   INR: 1.05 ratio         PTT - ( 18 Aug 2023 14:19 )  PTT:26.7 sec  Urinalysis Basic - ( 18 Aug 2023 14:19 )    Color: x / Appearance: x / SG: x / pH: x  Gluc: 96 mg/dL / Ketone: x  / Bili: x / Urobili: x   Blood: x / Protein: x / Nitrite: x   Leuk Esterase: x / RBC: x / WBC x   Sq Epi: x / Non Sq Epi: x / Bacteria: x        RADIOLOGY & ADDITIONAL STUDIES: CLIFF COLE  47y  Female [id number]  - # [id number], [place]    HPI: 48yo , LMP 2023, who presents to the ED after being referred to the ED for anemia found by her PCP of unclear etiology. GYN consulted for vulvar lesion of x1 week duration. Reports developing this bump x1 week ago that is painful. Was seen by her PMD x1 day ago for which she was started on Cephalexin 500mg for which she has taken x1 dose thus far. Has tried hot packs but not sitz baths. Denies any heavy or significant vaginal bleeding (denies a hx of heavy periods), fevers, or abnormal vaginal discharge. Of note, patient reports regularly shaving her vulva q3 days    Name of GYN Physician: Does not have one     POB:  x1 (), ToPx1    PGyn: Reports a having had a polypectomy at a clinic somewhere in [place]   - Denies any other GYN hx   MedHx: Denies  SurgHx: Sleeve gastrectomy  Meds: Vitamins  Allergies: NKDA  Social: Denies any tobacco use, drug use, or alcohol use       Vital Signs Last 24 Hrs  T(C): 37.1 (18 Aug 2023 11:28), Max: 37.1 (18 Aug 2023 11:28)  T(F): 98.8 (18 Aug 2023 11:28), Max: 98.8 (18 Aug 2023 11:28)  HR: 75 (18 Aug 2023 11:28) (75 - 75)  BP: 146/84 (18 Aug 2023 11:28) (146/84 - 146/84)  BP(mean): --  RR: 20 (18 Aug 2023 11:28) (20 - 20)  SpO2: 97% (18 Aug 2023 11:28) (97% - 97%)      Physical Exam:   General: Awake. Alert. NAD  Lungs: Unlabored breathing. No respiratory distress   Abd: Soft, non-tender, non-distended   (w/ ED tech chaperone Geraldine Kemp): Left vulva grossly normal. Right vulva w/ indurated lesion ~3cm in length and 2cm in width that comes to a pintpoint area. No warmth. Tender to palpation. No discharge or bleeding. No fluctuance appreciated. No involvement of the introitus or vagina w/ digital exam   Ext: No calf tenderness bilaterally    LABS:                              4.6    9.06  )-----------( 267      ( 18 Aug 2023 14:19 )             18.5         140  |  105  |  8   ----------------------------<  96  4.2   |  22  |  0.54    Ca    8.9      18 Aug 2023 14:19    TPro  7.6  /  Alb  4.4  /  TBili  0.2  /  DBili  x   /  AST  10  /  ALT  9<L>  /  AlkPhos  53      I&O's Detail    PT/INR - ( 18 Aug 2023 14:19 )   PT: 11.0 sec;   INR: 1.05 ratio         PTT - ( 18 Aug 2023 14:19 )  PTT:26.7 sec  Urinalysis Basic - ( 18 Aug 2023 14:19 )    Color: x / Appearance: x / SG: x / pH: x  Gluc: 96 mg/dL / Ketone: x  / Bili: x / Urobili: x   Blood: x / Protein: x / Nitrite: x   Leuk Esterase: x / RBC: x / WBC x   Sq Epi: x / Non Sq Epi: x / Bacteria: x        RADIOLOGY & ADDITIONAL STUDIES: CLIFF COLE  47y  Female [id number]  - # [id number], [place]    HPI: 48yo , LMP 2023, who presents to the ED after being referred to the ED for anemia found by her PCP of unclear etiology. GYN consulted for vulvar lesion of x1 week duration. Reports developing this bump x1 week ago that is painful. Was seen by her PMD x1 day ago for which she was started on Cephalexin 500mg for which she has taken x1 dose thus far. Has tried hot packs but not sitz baths. Denies any heavy or significant vaginal bleeding (denies a hx of heavy periods), fevers, or abnormal vaginal discharge. Of note, patient reports regularly shaving her vulva q3 days    Name of GYN Physician: Does not have one     POB:  x1 (), ToPx1    PGyn: Reports a having had a polypectomy at a clinic somewhere in [place]   - Denies any other GYN hx   MedHx: Denies  SurgHx: Sleeve gastrectomy  Meds: Vitamins  Allergies: NKDA  Social: Denies any tobacco use, drug use, or alcohol use       Vital Signs Last 24 Hrs  T(C): 37.1 (18 Aug 2023 11:28), Max: 37.1 (18 Aug 2023 11:28)  T(F): 98.8 (18 Aug 2023 11:28), Max: 98.8 (18 Aug 2023 11:28)  HR: 75 (18 Aug 2023 11:28) (75 - 75)  BP: 146/84 (18 Aug 2023 11:28) (146/84 - 146/84)  BP(mean): --  RR: 20 (18 Aug 2023 11:28) (20 - 20)  SpO2: 97% (18 Aug 2023 11:28) (97% - 97%)      Physical Exam:   General: Awake. Alert. NAD  Lungs: Unlabored breathing. No respiratory distress   Abd: Soft, non-tender, non-distended   (w/ ED tech chaperone Geraldine Kemp): Left vulva grossly normal. Right vulva w/ indurated lesion ~3cm in length and 2cm in width that comes to a pintpoint area. No warmth. Tender to palpation. No discharge or bleeding. No fluctuance appreciated. No involvement of the introitus or vagina w/ digital exam   Ext: No calf tenderness bilaterally    LABS:                              4.6    9.06  )-----------( 267      ( 18 Aug 2023 14:19 )             18.5         140  |  105  |  8   ----------------------------<  96  4.2   |  22  |  0.54    Ca    8.9      18 Aug 2023 14:19    TPro  7.6  /  Alb  4.4  /  TBili  0.2  /  DBili  x   /  AST  10  /  ALT  9<L>  /  AlkPhos  53      I&O's Detail    PT/INR - ( 18 Aug 2023 14:19 )   PT: 11.0 sec;   INR: 1.05 ratio         PTT - ( 18 Aug 2023 14:19 )  PTT:26.7 sec  Urinalysis Basic - ( 18 Aug 2023 14:19 )    Color: x / Appearance: x / SG: x / pH: x  Gluc: 96 mg/dL / Ketone: x  / Bili: x / Urobili: x   Blood: x / Protein: x / Nitrite: x   Leuk Esterase: x / RBC: x / WBC x   Sq Epi: x / Non Sq Epi: x / Bacteria: x        RADIOLOGY & ADDITIONAL STUDIES: CLIFF COLE  47y  Female [id number]  - # [id number], [place]    HPI: 48yo , LMP 2023, who presents to the ED after being referred to the ED for anemia found by her PCP of unclear etiology. GYN consulted for vulvar lesion of x1 week duration. Reports developing this bump x1 week ago that is painful. Was seen by her PMD x1 day ago for which she was started on Cephalexin 500mg for which she has taken x1 dose thus far. Has tried hot packs but not sitz baths. Denies any heavy or significant vaginal bleeding (denies a hx of heavy periods), fevers, or abnormal vaginal discharge. Of note, patient reports regularly shaving her vulva q3 days    Name of GYN Physician: Does not have one     POB:  x1 (), ToPx1    PGyn: Reports a having had a polypectomy at a clinic somewhere in [place]   - Denies any other GYN hx   MedHx: Denies  SurgHx: Sleeve gastrectomy  Meds: Vitamins  Allergies: NKDA  Social: Denies any tobacco use, drug use, or alcohol use       Vital Signs Last 24 Hrs  T(C): 37.1 (18 Aug 2023 11:28), Max: 37.1 (18 Aug 2023 11:28)  T(F): 98.8 (18 Aug 2023 11:28), Max: 98.8 (18 Aug 2023 11:28)  HR: 75 (18 Aug 2023 11:28) (75 - 75)  BP: 146/84 (18 Aug 2023 11:28) (146/84 - 146/84)  BP(mean): --  RR: 20 (18 Aug 2023 11:28) (20 - 20)  SpO2: 97% (18 Aug 2023 11:28) (97% - 97%)      Physical Exam:   General: Awake. Alert. NAD  Lungs: Unlabored breathing. No respiratory distress   Abd: Soft, non-tender, non-distended   (w/ ED tech chaperone Geraldine Kemp): Left vulva grossly normal. Right vulva w/ indurated lesion ~3cm in length and 2cm in width that comes to a pintpoint area. No warmth. Tender to palpation. No discharge or bleeding. No fluctuance appreciated. No involvement of the introitus or vagina w/ digital exam   Ext: No calf tenderness bilaterally    LABS:                              4.6    9.06  )-----------( 267      ( 18 Aug 2023 14:19 )             18.5         140  |  105  |  8   ----------------------------<  96  4.2   |  22  |  0.54    Ca    8.9      18 Aug 2023 14:19    TPro  7.6  /  Alb  4.4  /  TBili  0.2  /  DBili  x   /  AST  10  /  ALT  9<L>  /  AlkPhos  53      I&O's Detail    PT/INR - ( 18 Aug 2023 14:19 )   PT: 11.0 sec;   INR: 1.05 ratio         PTT - ( 18 Aug 2023 14:19 )  PTT:26.7 sec  Urinalysis Basic - ( 18 Aug 2023 14:19 )    Color: x / Appearance: x / SG: x / pH: x  Gluc: 96 mg/dL / Ketone: x  / Bili: x / Urobili: x   Blood: x / Protein: x / Nitrite: x   Leuk Esterase: x / RBC: x / WBC x   Sq Epi: x / Non Sq Epi: x / Bacteria: x        RADIOLOGY & ADDITIONAL STUDIES:

## 2023-08-18 NOTE — CONSULT NOTE ADULT - SUBJECTIVE AND OBJECTIVE BOX
Chief Complaint:  Patient is a 47y old  Female who presents with a chief complaint of     HPI: 46yo Arabic speaking F with PMH anemia, gastric sleeve (done 2020) who presents to the ED after being referred to the ED for anemia found by her PCP of unclear etiology. Reports 1 month of fatigue, drowsiness, shortness of breath. She has been constipated for about a month, and has had very dark stool for the past few days. Report vomiting 8/17 PTA. Has also been reporting a vulvar lesion of x1 week duration. Denies any heavy or significant vaginal bleeding (denies a hx of heavy periods), fevers, or abnormal vaginal discharge.     Otherwise, patient denies fevers, chills, weight loss, dysphagia, odynophagia, early satiety, poor oral intake, abdominal pain, nausea, vomiting, diarrhea, melena, hematemesis, hematochezia, change in stool caliber, or family history of GI-related cancers.    LMP 7/31/2023    Allergies:  No Known Allergies      Home Medications:    Hospital Medications:      PMHX/PSHX:      Family history:   Denies any family history of GI-related disease or cancers.    Social History:   ETOH: denies  Tobacco: denies  Illicit drug use: denies    ROS: 14 point ROS negative unless otherwise stated in HPI      Vital Signs:  Vital Signs Last 24 Hrs  T(C): 37.1 (18 Aug 2023 11:28), Max: 37.1 (18 Aug 2023 11:28)  T(F): 98.8 (18 Aug 2023 11:28), Max: 98.8 (18 Aug 2023 11:28)  HR: 75 (18 Aug 2023 11:28) (75 - 75)  BP: 146/84 (18 Aug 2023 11:28) (146/84 - 146/84)  BP(mean): --  RR: 20 (18 Aug 2023 11:28) (20 - 20)  SpO2: 97% (18 Aug 2023 11:28) (97% - 97%)      Daily     Daily     PHYSICAL EXAM:     GENERAL:  Appears stated age, well-groomed, well-nourished, no distress  HEENT:  NC/AT,  conjunctivae clear and pink  CHEST:  Full & symmetric excursion, no increased effort, breath sounds clear  HEART:  Regular rhythm, S1, S2, no murmur/rub/S3/S4  ABDOMEN:  Soft, non-tender, non-distended, normoactive bowel sounds,    EXTREMITIES:  no cyanosis,clubbing or edema  SKIN:  No rash/erythema/ecchymoses/petechiae/wounds/abscess/warm/dry  NEURO:  Alert, orientedx3      LABS:                        4.6    9.06  )-----------( 267      ( 18 Aug 2023 14:19 )             18.5     08-18    140  |  105  |  8   ----------------------------<  96  4.2   |  22  |  0.54    Ca    8.9      18 Aug 2023 14:19    TPro  7.6  /  Alb  4.4  /  TBili  0.2  /  DBili  x   /  AST  10  /  ALT  9<L>  /  AlkPhos  53  08-18    LIVER FUNCTIONS - ( 18 Aug 2023 14:19 )  Alb: 4.4 g/dL / Pro: 7.6 g/dL / ALK PHOS: 53 U/L / ALT: 9 U/L / AST: 10 U/L / GGT: x           PT/INR - ( 18 Aug 2023 14:19 )   PT: 11.0 sec;   INR: 1.05 ratio         PTT - ( 18 Aug 2023 14:19 )  PTT:26.7 sec  Urinalysis Basic - ( 18 Aug 2023 14:19 )    Color: x / Appearance: x / SG: x / pH: x  Gluc: 96 mg/dL / Ketone: x  / Bili: x / Urobili: x   Blood: x / Protein: x / Nitrite: x   Leuk Esterase: x / RBC: x / WBC x   Sq Epi: x / Non Sq Epi: x / Bacteria: x          Imaging:              Chief Complaint:  Patient is a 47y old  Female who presents with a chief complaint of     HPI: 46yo Kazakh speaking F with PMH anemia, gastric sleeve (done 2020) who presents to the ED after being referred to the ED for anemia found by her PCP of unclear etiology. Reports 1 month of fatigue, drowsiness, shortness of breath. She has been constipated for about a month, and has had very dark stool for the past few days. Report vomiting 8/17 PTA. Has also been reporting a vulvar lesion of x1 week duration. Denies any heavy or significant vaginal bleeding (denies a hx of heavy periods), fevers, or abnormal vaginal discharge.     Otherwise, patient denies fevers, chills, weight loss, dysphagia, odynophagia, early satiety, poor oral intake, abdominal pain, nausea, vomiting, diarrhea, melena, hematemesis, hematochezia, change in stool caliber, or family history of GI-related cancers.    LMP 7/31/2023    Allergies:  No Known Allergies      Home Medications:    Hospital Medications:      PMHX/PSHX:      Family history:   Denies any family history of GI-related disease or cancers.    Social History:   ETOH: denies  Tobacco: denies  Illicit drug use: denies    ROS: 14 point ROS negative unless otherwise stated in HPI      Vital Signs:  Vital Signs Last 24 Hrs  T(C): 37.1 (18 Aug 2023 11:28), Max: 37.1 (18 Aug 2023 11:28)  T(F): 98.8 (18 Aug 2023 11:28), Max: 98.8 (18 Aug 2023 11:28)  HR: 75 (18 Aug 2023 11:28) (75 - 75)  BP: 146/84 (18 Aug 2023 11:28) (146/84 - 146/84)  BP(mean): --  RR: 20 (18 Aug 2023 11:28) (20 - 20)  SpO2: 97% (18 Aug 2023 11:28) (97% - 97%)      Daily     Daily     PHYSICAL EXAM:     GENERAL:  Appears stated age, well-groomed, well-nourished, no distress  HEENT:  NC/AT,  conjunctivae clear and pink  CHEST:  Full & symmetric excursion, no increased effort, breath sounds clear  HEART:  Regular rhythm, S1, S2, no murmur/rub/S3/S4  ABDOMEN:  Soft, non-tender, non-distended, normoactive bowel sounds,    EXTREMITIES:  no cyanosis,clubbing or edema  SKIN:  No rash/erythema/ecchymoses/petechiae/wounds/abscess/warm/dry  NEURO:  Alert, orientedx3      LABS:                        4.6    9.06  )-----------( 267      ( 18 Aug 2023 14:19 )             18.5     08-18    140  |  105  |  8   ----------------------------<  96  4.2   |  22  |  0.54    Ca    8.9      18 Aug 2023 14:19    TPro  7.6  /  Alb  4.4  /  TBili  0.2  /  DBili  x   /  AST  10  /  ALT  9<L>  /  AlkPhos  53  08-18    LIVER FUNCTIONS - ( 18 Aug 2023 14:19 )  Alb: 4.4 g/dL / Pro: 7.6 g/dL / ALK PHOS: 53 U/L / ALT: 9 U/L / AST: 10 U/L / GGT: x           PT/INR - ( 18 Aug 2023 14:19 )   PT: 11.0 sec;   INR: 1.05 ratio         PTT - ( 18 Aug 2023 14:19 )  PTT:26.7 sec  Urinalysis Basic - ( 18 Aug 2023 14:19 )    Color: x / Appearance: x / SG: x / pH: x  Gluc: 96 mg/dL / Ketone: x  / Bili: x / Urobili: x   Blood: x / Protein: x / Nitrite: x   Leuk Esterase: x / RBC: x / WBC x   Sq Epi: x / Non Sq Epi: x / Bacteria: x          Imaging:              Chief Complaint:  Patient is a 47y old  Female who presents with a chief complaint of     HPI: 48yo Persian speaking F with PMH anemia, gastric sleeve (done 2020) who presents to the ED after being referred to the ED for anemia found by her PCP of unclear etiology. Reports 1 month of fatigue, drowsiness, shortness of breath. She has been constipated for about a month, and has had very dark stool for the past few days. Report vomiting 8/17 PTA. Has also been reporting a vulvar lesion of x1 week duration. Denies any heavy or significant vaginal bleeding (denies a hx of heavy periods), fevers, or abnormal vaginal discharge.     Otherwise, patient denies fevers, chills, weight loss, dysphagia, odynophagia, early satiety, poor oral intake, abdominal pain, nausea, vomiting, diarrhea, melena, hematemesis, hematochezia, change in stool caliber, or family history of GI-related cancers.    LMP 7/31/2023    Allergies:  No Known Allergies      Home Medications:    Hospital Medications:      PMHX/PSHX:      Family history:   Denies any family history of GI-related disease or cancers.    Social History:   ETOH: denies  Tobacco: denies  Illicit drug use: denies    ROS: 14 point ROS negative unless otherwise stated in HPI      Vital Signs:  Vital Signs Last 24 Hrs  T(C): 37.1 (18 Aug 2023 11:28), Max: 37.1 (18 Aug 2023 11:28)  T(F): 98.8 (18 Aug 2023 11:28), Max: 98.8 (18 Aug 2023 11:28)  HR: 75 (18 Aug 2023 11:28) (75 - 75)  BP: 146/84 (18 Aug 2023 11:28) (146/84 - 146/84)  BP(mean): --  RR: 20 (18 Aug 2023 11:28) (20 - 20)  SpO2: 97% (18 Aug 2023 11:28) (97% - 97%)      Daily     Daily     PHYSICAL EXAM:     GENERAL:  Appears stated age, well-groomed, well-nourished, no distress  HEENT:  NC/AT,  conjunctivae clear and pink  CHEST:  Full & symmetric excursion, no increased effort, breath sounds clear  HEART:  Regular rhythm, S1, S2, no murmur/rub/S3/S4  ABDOMEN:  Soft, non-tender, non-distended, normoactive bowel sounds,    EXTREMITIES:  no cyanosis,clubbing or edema  SKIN:  No rash/erythema/ecchymoses/petechiae/wounds/abscess/warm/dry  NEURO:  Alert, orientedx3      LABS:                        4.6    9.06  )-----------( 267      ( 18 Aug 2023 14:19 )             18.5     08-18    140  |  105  |  8   ----------------------------<  96  4.2   |  22  |  0.54    Ca    8.9      18 Aug 2023 14:19    TPro  7.6  /  Alb  4.4  /  TBili  0.2  /  DBili  x   /  AST  10  /  ALT  9<L>  /  AlkPhos  53  08-18    LIVER FUNCTIONS - ( 18 Aug 2023 14:19 )  Alb: 4.4 g/dL / Pro: 7.6 g/dL / ALK PHOS: 53 U/L / ALT: 9 U/L / AST: 10 U/L / GGT: x           PT/INR - ( 18 Aug 2023 14:19 )   PT: 11.0 sec;   INR: 1.05 ratio         PTT - ( 18 Aug 2023 14:19 )  PTT:26.7 sec  Urinalysis Basic - ( 18 Aug 2023 14:19 )    Color: x / Appearance: x / SG: x / pH: x  Gluc: 96 mg/dL / Ketone: x  / Bili: x / Urobili: x   Blood: x / Protein: x / Nitrite: x   Leuk Esterase: x / RBC: x / WBC x   Sq Epi: x / Non Sq Epi: x / Bacteria: x          Imaging:              Chief Complaint:  Patient is a 47y old  Female who presents with a chief complaint of      #[id number] was used.    HPI: 48yo Maltese speaking F with PMH chronic anemia (dx at age 14; on iron supplements), chronic intermittent constipation, gastric sleeve (done 2020) who presents to the ED after being referred to the ED for acute worsening of chronic anemia. Reports 1 month of fatigue, drowsiness, shortness of breath; had OP blood work done by PCP and was told to come to the ED for further eval. Report intermittent vomiting 2/2 known food triggers such as fruits, watermelon, fatty/greasy foods; last vomited yesterday after eating watermelon. Has also been reporting a vulvar lesion of x1 week duration. Reports history of heavy vaginal bleeding with menses- changes pads 5-7x/day during first few days of menses which she states is bloody with dark clots. Reports chronic nightly NSAID use (advil) few times per week that she takes to help her sleep when she has migraines lasting several days. Reports epigastric abd pain x 1 month. GI consulted for anemia w/o overt GIB.    Otherwise, patient denies weight loss, nausea, vomiting, diarrhea, melena, hematemesis, hematochezia, change in stool caliber, or family history of GI-related cancers.    LMP 7/31/2023    Allergies:  No Known Allergies      Home Medications:    Hospital Medications:      PMHX/PSHX:      Family history:   Denies any family history of GI-related disease or cancers.    Social History:   ETOH: denies  Tobacco: denies  Illicit drug use: denies    ROS: 14 point ROS negative unless otherwise stated in HPI      Vital Signs:  Vital Signs Last 24 Hrs  T(C): 37.1 (18 Aug 2023 11:28), Max: 37.1 (18 Aug 2023 11:28)  T(F): 98.8 (18 Aug 2023 11:28), Max: 98.8 (18 Aug 2023 11:28)  HR: 75 (18 Aug 2023 11:28) (75 - 75)  BP: 146/84 (18 Aug 2023 11:28) (146/84 - 146/84)  BP(mean): --  RR: 20 (18 Aug 2023 11:28) (20 - 20)  SpO2: 97% (18 Aug 2023 11:28) (97% - 97%)      Daily     Daily     PHYSICAL EXAM:     GENERAL:  Appears stated age, well-groomed, well-nourished, no distress  HEENT:  NC/AT,  conjunctivae clear and pink  CHEST:  Full & symmetric excursion, no increased effort, breath sounds clear  HEART:  Regular rhythm, S1, S2, no murmur/rub/S3/S4  ABDOMEN:  Soft, non-tender, non-distended, normoactive bowel sounds,    EXTREMITIES:  no cyanosis,clubbing or edema  SKIN:  No rash/erythema/ecchymoses/petechiae/wounds/abscess/warm/dry  NEURO:  Alert, orientedx3      LABS:                        4.6    9.06  )-----------( 267      ( 18 Aug 2023 14:19 )             18.5     08-18    140  |  105  |  8   ----------------------------<  96  4.2   |  22  |  0.54    Ca    8.9      18 Aug 2023 14:19    TPro  7.6  /  Alb  4.4  /  TBili  0.2  /  DBili  x   /  AST  10  /  ALT  9<L>  /  AlkPhos  53  08-18    LIVER FUNCTIONS - ( 18 Aug 2023 14:19 )  Alb: 4.4 g/dL / Pro: 7.6 g/dL / ALK PHOS: 53 U/L / ALT: 9 U/L / AST: 10 U/L / GGT: x           PT/INR - ( 18 Aug 2023 14:19 )   PT: 11.0 sec;   INR: 1.05 ratio         PTT - ( 18 Aug 2023 14:19 )  PTT:26.7 sec  Urinalysis Basic - ( 18 Aug 2023 14:19 )    Color: x / Appearance: x / SG: x / pH: x  Gluc: 96 mg/dL / Ketone: x  / Bili: x / Urobili: x   Blood: x / Protein: x / Nitrite: x   Leuk Esterase: x / RBC: x / WBC x   Sq Epi: x / Non Sq Epi: x / Bacteria: x          Imaging:              Chief Complaint:  Patient is a 47y old  Female who presents with a chief complaint of      #[id number] was used.    HPI: 46yo Frisian speaking F with PMH chronic anemia (dx at age 14; on iron supplements), chronic intermittent constipation, gastric sleeve (done 2020) who presents to the ED after being referred to the ED for acute worsening of chronic anemia. Reports 1 month of fatigue, drowsiness, shortness of breath; had OP blood work done by PCP and was told to come to the ED for further eval. Report intermittent vomiting 2/2 known food triggers such as fruits, watermelon, fatty/greasy foods; last vomited yesterday after eating watermelon. Has also been reporting a vulvar lesion of x1 week duration. Reports history of heavy vaginal bleeding with menses- changes pads 5-7x/day during first few days of menses which she states is bloody with dark clots. Reports chronic nightly NSAID use (advil) few times per week that she takes to help her sleep when she has migraines lasting several days. Reports epigastric abd pain x 1 month. GI consulted for anemia w/o overt GIB.    Otherwise, patient denies weight loss, nausea, vomiting, diarrhea, melena, hematemesis, hematochezia, change in stool caliber, or family history of GI-related cancers.    LMP 7/31/2023    Allergies:  No Known Allergies      Home Medications:    Hospital Medications:      PMHX/PSHX:      Family history:   Denies any family history of GI-related disease or cancers.    Social History:   ETOH: denies  Tobacco: denies  Illicit drug use: denies    ROS: 14 point ROS negative unless otherwise stated in HPI      Vital Signs:  Vital Signs Last 24 Hrs  T(C): 37.1 (18 Aug 2023 11:28), Max: 37.1 (18 Aug 2023 11:28)  T(F): 98.8 (18 Aug 2023 11:28), Max: 98.8 (18 Aug 2023 11:28)  HR: 75 (18 Aug 2023 11:28) (75 - 75)  BP: 146/84 (18 Aug 2023 11:28) (146/84 - 146/84)  BP(mean): --  RR: 20 (18 Aug 2023 11:28) (20 - 20)  SpO2: 97% (18 Aug 2023 11:28) (97% - 97%)      Daily     Daily     PHYSICAL EXAM:     GENERAL:  Appears stated age, well-groomed, well-nourished, no distress  HEENT:  NC/AT,  conjunctivae clear and pink  CHEST:  Full & symmetric excursion, no increased effort, breath sounds clear  HEART:  Regular rhythm, S1, S2, no murmur/rub/S3/S4  ABDOMEN:  Soft, non-tender, non-distended, normoactive bowel sounds,    EXTREMITIES:  no cyanosis,clubbing or edema  SKIN:  No rash/erythema/ecchymoses/petechiae/wounds/abscess/warm/dry  NEURO:  Alert, orientedx3      LABS:                        4.6    9.06  )-----------( 267      ( 18 Aug 2023 14:19 )             18.5     08-18    140  |  105  |  8   ----------------------------<  96  4.2   |  22  |  0.54    Ca    8.9      18 Aug 2023 14:19    TPro  7.6  /  Alb  4.4  /  TBili  0.2  /  DBili  x   /  AST  10  /  ALT  9<L>  /  AlkPhos  53  08-18    LIVER FUNCTIONS - ( 18 Aug 2023 14:19 )  Alb: 4.4 g/dL / Pro: 7.6 g/dL / ALK PHOS: 53 U/L / ALT: 9 U/L / AST: 10 U/L / GGT: x           PT/INR - ( 18 Aug 2023 14:19 )   PT: 11.0 sec;   INR: 1.05 ratio         PTT - ( 18 Aug 2023 14:19 )  PTT:26.7 sec  Urinalysis Basic - ( 18 Aug 2023 14:19 )    Color: x / Appearance: x / SG: x / pH: x  Gluc: 96 mg/dL / Ketone: x  / Bili: x / Urobili: x   Blood: x / Protein: x / Nitrite: x   Leuk Esterase: x / RBC: x / WBC x   Sq Epi: x / Non Sq Epi: x / Bacteria: x          Imaging:              Chief Complaint:  Patient is a 47y old  Female who presents with a chief complaint of      #[id number] was used.    HPI: 46yo Bengali speaking F with PMH chronic anemia (dx at age 14; on iron supplements), chronic intermittent constipation, gastric sleeve (done 2020) who presents to the ED after being referred to the ED for acute worsening of chronic anemia. Reports 1 month of fatigue, drowsiness, shortness of breath; had OP blood work done by PCP and was told to come to the ED for further eval. Report intermittent vomiting 2/2 known food triggers such as fruits, watermelon, fatty/greasy foods; last vomited yesterday after eating watermelon. Has also been reporting a vulvar lesion of x1 week duration. Reports history of heavy vaginal bleeding with menses- changes pads 5-7x/day during first few days of menses which she states is bloody with dark clots. Reports chronic nightly NSAID use (advil) few times per week that she takes to help her sleep when she has migraines lasting several days. Reports epigastric abd pain x 1 month. GI consulted for anemia w/o overt GIB.    Otherwise, patient denies weight loss, nausea, vomiting, diarrhea, melena, hematemesis, hematochezia, change in stool caliber, or family history of GI-related cancers.    LMP 7/31/2023    Allergies:  No Known Allergies      Home Medications:    Hospital Medications:      PMHX/PSHX:      Family history:   Denies any family history of GI-related disease or cancers.    Social History:   ETOH: denies  Tobacco: denies  Illicit drug use: denies    ROS: 14 point ROS negative unless otherwise stated in HPI      Vital Signs:  Vital Signs Last 24 Hrs  T(C): 37.1 (18 Aug 2023 11:28), Max: 37.1 (18 Aug 2023 11:28)  T(F): 98.8 (18 Aug 2023 11:28), Max: 98.8 (18 Aug 2023 11:28)  HR: 75 (18 Aug 2023 11:28) (75 - 75)  BP: 146/84 (18 Aug 2023 11:28) (146/84 - 146/84)  BP(mean): --  RR: 20 (18 Aug 2023 11:28) (20 - 20)  SpO2: 97% (18 Aug 2023 11:28) (97% - 97%)      Daily     Daily     PHYSICAL EXAM:     GENERAL:  Appears stated age, well-groomed, well-nourished, no distress  HEENT:  NC/AT,  conjunctivae clear and pink  CHEST:  Full & symmetric excursion, no increased effort, breath sounds clear  HEART:  Regular rhythm, S1, S2, no murmur/rub/S3/S4  ABDOMEN:  Soft, non-tender, non-distended, normoactive bowel sounds,    EXTREMITIES:  no cyanosis,clubbing or edema  SKIN:  No rash/erythema/ecchymoses/petechiae/wounds/abscess/warm/dry  NEURO:  Alert, orientedx3      LABS:                        4.6    9.06  )-----------( 267      ( 18 Aug 2023 14:19 )             18.5     08-18    140  |  105  |  8   ----------------------------<  96  4.2   |  22  |  0.54    Ca    8.9      18 Aug 2023 14:19    TPro  7.6  /  Alb  4.4  /  TBili  0.2  /  DBili  x   /  AST  10  /  ALT  9<L>  /  AlkPhos  53  08-18    LIVER FUNCTIONS - ( 18 Aug 2023 14:19 )  Alb: 4.4 g/dL / Pro: 7.6 g/dL / ALK PHOS: 53 U/L / ALT: 9 U/L / AST: 10 U/L / GGT: x           PT/INR - ( 18 Aug 2023 14:19 )   PT: 11.0 sec;   INR: 1.05 ratio         PTT - ( 18 Aug 2023 14:19 )  PTT:26.7 sec  Urinalysis Basic - ( 18 Aug 2023 14:19 )    Color: x / Appearance: x / SG: x / pH: x  Gluc: 96 mg/dL / Ketone: x  / Bili: x / Urobili: x   Blood: x / Protein: x / Nitrite: x   Leuk Esterase: x / RBC: x / WBC x   Sq Epi: x / Non Sq Epi: x / Bacteria: x          Imaging:              Chief Complaint:  Patient is a 47y old  Female who presents with a chief complaint of      #[id number] was used.    HPI: 46yo Latvian speaking F with PMH chronic anemia (dx at age 14; on iron supplements), chronic intermittent constipation, gastric sleeve (done 2020) who presents to the ED after being referred to the ED for acute worsening of chronic anemia. Reports 1 month of fatigue, drowsiness, shortness of breath; had OP blood work done by PCP and was told to come to the ED for further eval. Report intermittent vomiting 2/2 known food triggers such as fruits, watermelon, fatty/greasy foods; last vomited yesterday after eating watermelon. Has also been reporting a vulvar lesion of x1 week duration. Reports history of heavy vaginal bleeding with menses- changes pads 5-7x/day during first few days of menses which she states is bloody with dark clots. Reports chronic nightly NSAID use (advil) few times per week that she takes to help her sleep when she has migraines lasting several days. Reports epigastric abd pain x 1 month. GI consulted for anemia w/o overt GIB.    Otherwise, patient denies weight loss, nausea, vomiting, diarrhea, melena, hematemesis, hematochezia, change in stool caliber, or family history of GI-related cancers.    LMP 7/31/2023    Allergies:  No Known Allergies      Home Medications:    Hospital Medications:      PMHX/PSHX:      Family history:   Denies any family history of GI-related disease or cancers.    Social History:   ETOH: denies  Tobacco: denies   Illicit drug use: denies    ROS: 14 point ROS negative unless otherwise stated in HPI      Vital Signs:  Vital Signs Last 24 Hrs  T(C): 37.1 (18 Aug 2023 11:28), Max: 37.1 (18 Aug 2023 11:28)  T(F): 98.8 (18 Aug 2023 11:28), Max: 98.8 (18 Aug 2023 11:28)  HR: 75 (18 Aug 2023 11:28) (75 - 75)  BP: 146/84 (18 Aug 2023 11:28) (146/84 - 146/84)  BP(mean): --  RR: 20 (18 Aug 2023 11:28) (20 - 20)  SpO2: 97% (18 Aug 2023 11:28) (97% - 97%)      Daily     Daily     PHYSICAL EXAM:     GENERAL:  Appears stated age, well-groomed, well-nourished, no distress  HEENT:  NC/AT,  conjunctivae clear and pink  CHEST:  Full & symmetric excursion, no increased effort, breath sounds clear  HEART:  Regular rhythm, S1, S2, no murmur/rub/S3/S4  ABDOMEN:  Soft, non-tender, non-distended, normoactive bowel sounds,    EXTREMITIES:  no cyanosis,clubbing or edema  SKIN:  No rash/erythema/ecchymoses/petechiae/wounds/abscess/warm/dry  NEURO:  Alert, orientedx3      LABS:                        4.6    9.06  )-----------( 267      ( 18 Aug 2023 14:19 )             18.5     08-18    140  |  105  |  8   ----------------------------<  96  4.2   |  22  |  0.54    Ca    8.9      18 Aug 2023 14:19    TPro  7.6  /  Alb  4.4  /  TBili  0.2  /  DBili  x   /  AST  10  /  ALT  9<L>  /  AlkPhos  53  08-18    LIVER FUNCTIONS - ( 18 Aug 2023 14:19 )  Alb: 4.4 g/dL / Pro: 7.6 g/dL / ALK PHOS: 53 U/L / ALT: 9 U/L / AST: 10 U/L / GGT: x           PT/INR - ( 18 Aug 2023 14:19 )   PT: 11.0 sec;   INR: 1.05 ratio         PTT - ( 18 Aug 2023 14:19 )  PTT:26.7 sec  Urinalysis Basic - ( 18 Aug 2023 14:19 )    Color: x / Appearance: x / SG: x / pH: x  Gluc: 96 mg/dL / Ketone: x  / Bili: x / Urobili: x   Blood: x / Protein: x / Nitrite: x   Leuk Esterase: x / RBC: x / WBC x   Sq Epi: x / Non Sq Epi: x / Bacteria: x          Imaging:              Chief Complaint:  Patient is a 47y old  Female who presents with a chief complaint of      #[id number] was used.    HPI: 48yo Slovenian speaking F with PMH chronic anemia (dx at age 14; on iron supplements), chronic intermittent constipation, gastric sleeve (done 2020) who presents to the ED after being referred to the ED for acute worsening of chronic anemia. Reports 1 month of fatigue, drowsiness, shortness of breath; had OP blood work done by PCP and was told to come to the ED for further eval. Report intermittent vomiting 2/2 known food triggers such as fruits, watermelon, fatty/greasy foods; last vomited yesterday after eating watermelon. Has also been reporting a vulvar lesion of x1 week duration. Reports history of heavy vaginal bleeding with menses- changes pads 5-7x/day during first few days of menses which she states is bloody with dark clots. Reports chronic nightly NSAID use (advil) few times per week that she takes to help her sleep when she has migraines lasting several days. Reports epigastric abd pain x 1 month. GI consulted for anemia w/o overt GIB.    Otherwise, patient denies weight loss, nausea, vomiting, diarrhea, melena, hematemesis, hematochezia, change in stool caliber, or family history of GI-related cancers.    LMP 7/31/2023    Allergies:  No Known Allergies      Home Medications:    Hospital Medications:      PMHX/PSHX:      Family history:   Denies any family history of GI-related disease or cancers.    Social History:   ETOH: denies  Tobacco: denies   Illicit drug use: denies    ROS: 14 point ROS negative unless otherwise stated in HPI      Vital Signs:  Vital Signs Last 24 Hrs  T(C): 37.1 (18 Aug 2023 11:28), Max: 37.1 (18 Aug 2023 11:28)  T(F): 98.8 (18 Aug 2023 11:28), Max: 98.8 (18 Aug 2023 11:28)  HR: 75 (18 Aug 2023 11:28) (75 - 75)  BP: 146/84 (18 Aug 2023 11:28) (146/84 - 146/84)  BP(mean): --  RR: 20 (18 Aug 2023 11:28) (20 - 20)  SpO2: 97% (18 Aug 2023 11:28) (97% - 97%)      Daily     Daily     PHYSICAL EXAM:     GENERAL:  Appears stated age, well-groomed, well-nourished, no distress  HEENT:  NC/AT,  conjunctivae clear and pink  CHEST:  Full & symmetric excursion, no increased effort, breath sounds clear  HEART:  Regular rhythm, S1, S2, no murmur/rub/S3/S4  ABDOMEN:  Soft, non-tender, non-distended, normoactive bowel sounds,    EXTREMITIES:  no cyanosis,clubbing or edema  SKIN:  No rash/erythema/ecchymoses/petechiae/wounds/abscess/warm/dry  NEURO:  Alert, orientedx3      LABS:                        4.6    9.06  )-----------( 267      ( 18 Aug 2023 14:19 )             18.5     08-18    140  |  105  |  8   ----------------------------<  96  4.2   |  22  |  0.54    Ca    8.9      18 Aug 2023 14:19    TPro  7.6  /  Alb  4.4  /  TBili  0.2  /  DBili  x   /  AST  10  /  ALT  9<L>  /  AlkPhos  53  08-18    LIVER FUNCTIONS - ( 18 Aug 2023 14:19 )  Alb: 4.4 g/dL / Pro: 7.6 g/dL / ALK PHOS: 53 U/L / ALT: 9 U/L / AST: 10 U/L / GGT: x           PT/INR - ( 18 Aug 2023 14:19 )   PT: 11.0 sec;   INR: 1.05 ratio         PTT - ( 18 Aug 2023 14:19 )  PTT:26.7 sec  Urinalysis Basic - ( 18 Aug 2023 14:19 )    Color: x / Appearance: x / SG: x / pH: x  Gluc: 96 mg/dL / Ketone: x  / Bili: x / Urobili: x   Blood: x / Protein: x / Nitrite: x   Leuk Esterase: x / RBC: x / WBC x   Sq Epi: x / Non Sq Epi: x / Bacteria: x          Imaging:              Chief Complaint:  Patient is a 47y old  Female who presents with a chief complaint of      #[id number] was used.    HPI: 48yo Hungarian speaking F with PMH chronic anemia (dx at age 14; on iron supplements), chronic intermittent constipation, gastric sleeve (done 2020) who presents to the ED after being referred to the ED for acute worsening of chronic anemia. Reports 1 month of fatigue, drowsiness, shortness of breath; had OP blood work done by PCP and was told to come to the ED for further eval. Report intermittent vomiting 2/2 known food triggers such as fruits, watermelon, fatty/greasy foods; last vomited yesterday after eating watermelon. Has also been reporting a vulvar lesion of x1 week duration. Reports history of heavy vaginal bleeding with menses- changes pads 5-7x/day during first few days of menses which she states is bloody with dark clots. Reports chronic nightly NSAID use (advil) few times per week that she takes to help her sleep when she has migraines lasting several days. Reports epigastric abd pain x 1 month. GI consulted for anemia w/o overt GIB.    Otherwise, patient denies weight loss, nausea, vomiting, diarrhea, melena, hematemesis, hematochezia, change in stool caliber, or family history of GI-related cancers.    LMP 7/31/2023    Allergies:  No Known Allergies      Home Medications:    Hospital Medications:      PMHX/PSHX:      Family history:   Denies any family history of GI-related disease or cancers.    Social History:   ETOH: denies  Tobacco: denies   Illicit drug use: denies    ROS: 14 point ROS negative unless otherwise stated in HPI      Vital Signs:  Vital Signs Last 24 Hrs  T(C): 37.1 (18 Aug 2023 11:28), Max: 37.1 (18 Aug 2023 11:28)  T(F): 98.8 (18 Aug 2023 11:28), Max: 98.8 (18 Aug 2023 11:28)  HR: 75 (18 Aug 2023 11:28) (75 - 75)  BP: 146/84 (18 Aug 2023 11:28) (146/84 - 146/84)  BP(mean): --  RR: 20 (18 Aug 2023 11:28) (20 - 20)  SpO2: 97% (18 Aug 2023 11:28) (97% - 97%)      Daily     Daily     PHYSICAL EXAM:     GENERAL:  Appears stated age, well-groomed, well-nourished, no distress  HEENT:  NC/AT,  conjunctivae clear and pink  CHEST:  Full & symmetric excursion, no increased effort, breath sounds clear  HEART:  Regular rhythm, S1, S2, no murmur/rub/S3/S4  ABDOMEN:  Soft, non-tender, non-distended, normoactive bowel sounds,    EXTREMITIES:  no cyanosis,clubbing or edema  SKIN:  No rash/erythema/ecchymoses/petechiae/wounds/abscess/warm/dry  NEURO:  Alert, orientedx3      LABS:                        4.6    9.06  )-----------( 267      ( 18 Aug 2023 14:19 )             18.5     08-18    140  |  105  |  8   ----------------------------<  96  4.2   |  22  |  0.54    Ca    8.9      18 Aug 2023 14:19    TPro  7.6  /  Alb  4.4  /  TBili  0.2  /  DBili  x   /  AST  10  /  ALT  9<L>  /  AlkPhos  53  08-18    LIVER FUNCTIONS - ( 18 Aug 2023 14:19 )  Alb: 4.4 g/dL / Pro: 7.6 g/dL / ALK PHOS: 53 U/L / ALT: 9 U/L / AST: 10 U/L / GGT: x           PT/INR - ( 18 Aug 2023 14:19 )   PT: 11.0 sec;   INR: 1.05 ratio         PTT - ( 18 Aug 2023 14:19 )  PTT:26.7 sec  Urinalysis Basic - ( 18 Aug 2023 14:19 )    Color: x / Appearance: x / SG: x / pH: x  Gluc: 96 mg/dL / Ketone: x  / Bili: x / Urobili: x   Blood: x / Protein: x / Nitrite: x   Leuk Esterase: x / RBC: x / WBC x   Sq Epi: x / Non Sq Epi: x / Bacteria: x          Imaging:              Chief Complaint:  Patient is a 47y old  Female who presents with a chief complaint of      #[id number] was used.    HPI: 48yo English speaking F with PMH chronic anemia (dx at age 14; on iron supplements), chronic intermittent constipation, gastric sleeve (done 2020) who presents to the ED after being referred to the ED for acute worsening of chronic anemia. Reports 1 month of fatigue, drowsiness, shortness of breath; had OP blood work done by PCP and was told to come to the ED for further eval. Report intermittent vomiting 2/2 known food triggers such as fruits, watermelon, fatty/greasy foods; last vomited yesterday after eating watermelon. Has also been reporting a vulvar lesion of x1 week duration. Reports history of heavy vaginal bleeding with menses- changes pads 5-7x/day during first few days of menses which she states is bloody with dark clots. Reports chronic nightly NSAID use (advil) few times per week that she takes to help her sleep when she has migraines lasting several days. Reports epigastric abd pain x 1 month. GI consulted for anemia w/o overt GIB.    Otherwise, patient denies weight loss, nausea, vomiting, diarrhea, melena, hematemesis, hematochezia, change in stool caliber, or family history of GI-related cancers.    LMP 7/31/2023    Allergies:  No Known Allergies      Home Medications:    Hospital Medications:      PMHX/PSHX:      Family history:   Denies any family history of GI-related disease or cancers.    Social History:   ETOH: denies  Tobacco: denies   Illicit drug use: denies    ROS: 14 point ROS negative unless otherwise stated in HPI      Vital Signs:  Vital Signs Last 24 Hrs  T(C): 37.1 (18 Aug 2023 11:28), Max: 37.1 (18 Aug 2023 11:28)  T(F): 98.8 (18 Aug 2023 11:28), Max: 98.8 (18 Aug 2023 11:28)  HR: 75 (18 Aug 2023 11:28) (75 - 75)  BP: 146/84 (18 Aug 2023 11:28) (146/84 - 146/84)  BP(mean): --  RR: 20 (18 Aug 2023 11:28) (20 - 20)  SpO2: 97% (18 Aug 2023 11:28) (97% - 97%)      Daily     Daily     PHYSICAL EXAM:     GENERAL:  Appears stated age, well-groomed, well-nourished, no distress  HEENT:  NC/AT,  conjunctivae clear and pink  CHEST:  Full & symmetric excursion, no increased effort  HEART:  Regular rhythm, S1, S2, no murmur/rub/S3/S4  ABDOMEN:  Soft, +mild epigastric-tenderness, non-distended  RECTAL: +internal and external hemorrhoids; +no melena or hematochezia on smear  EXTREMITIES:  no cyanosis,clubbing or edema  SKIN:  No rash/erythema/ecchymoses/petechiae/wounds/abscess/warm/dry  NEURO:  Alert, orientedx3      LABS:                        4.6    9.06  )-----------( 267      ( 18 Aug 2023 14:19 )             18.5     08-18    140  |  105  |  8   ----------------------------<  96  4.2   |  22  |  0.54    Ca    8.9      18 Aug 2023 14:19    TPro  7.6  /  Alb  4.4  /  TBili  0.2  /  DBili  x   /  AST  10  /  ALT  9<L>  /  AlkPhos  53  08-18    LIVER FUNCTIONS - ( 18 Aug 2023 14:19 )  Alb: 4.4 g/dL / Pro: 7.6 g/dL / ALK PHOS: 53 U/L / ALT: 9 U/L / AST: 10 U/L / GGT: x           PT/INR - ( 18 Aug 2023 14:19 )   PT: 11.0 sec;   INR: 1.05 ratio         PTT - ( 18 Aug 2023 14:19 )  PTT:26.7 sec  Urinalysis Basic - ( 18 Aug 2023 14:19 )    Color: x / Appearance: x / SG: x / pH: x  Gluc: 96 mg/dL / Ketone: x  / Bili: x / Urobili: x   Blood: x / Protein: x / Nitrite: x   Leuk Esterase: x / RBC: x / WBC x   Sq Epi: x / Non Sq Epi: x / Bacteria: x          Imaging:              Chief Complaint:  Patient is a 47y old  Female who presents with a chief complaint of      #[id number] was used.    HPI: 48yo Persian speaking F with PMH chronic anemia (dx at age 14; on iron supplements), chronic intermittent constipation, gastric sleeve (done 2020) who presents to the ED after being referred to the ED for acute worsening of chronic anemia. Reports 1 month of fatigue, drowsiness, shortness of breath; had OP blood work done by PCP and was told to come to the ED for further eval. Report intermittent vomiting 2/2 known food triggers such as fruits, watermelon, fatty/greasy foods; last vomited yesterday after eating watermelon. Has also been reporting a vulvar lesion of x1 week duration. Reports history of heavy vaginal bleeding with menses- changes pads 5-7x/day during first few days of menses which she states is bloody with dark clots. Reports chronic nightly NSAID use (advil) few times per week that she takes to help her sleep when she has migraines lasting several days. Reports epigastric abd pain x 1 month. GI consulted for anemia w/o overt GIB.    Otherwise, patient denies weight loss, nausea, vomiting, diarrhea, melena, hematemesis, hematochezia, change in stool caliber, or family history of GI-related cancers.    LMP 7/31/2023    Allergies:  No Known Allergies      Home Medications:    Hospital Medications:      PMHX/PSHX:      Family history:   Denies any family history of GI-related disease or cancers.    Social History:   ETOH: denies  Tobacco: denies   Illicit drug use: denies    ROS: 14 point ROS negative unless otherwise stated in HPI      Vital Signs:  Vital Signs Last 24 Hrs  T(C): 37.1 (18 Aug 2023 11:28), Max: 37.1 (18 Aug 2023 11:28)  T(F): 98.8 (18 Aug 2023 11:28), Max: 98.8 (18 Aug 2023 11:28)  HR: 75 (18 Aug 2023 11:28) (75 - 75)  BP: 146/84 (18 Aug 2023 11:28) (146/84 - 146/84)  BP(mean): --  RR: 20 (18 Aug 2023 11:28) (20 - 20)  SpO2: 97% (18 Aug 2023 11:28) (97% - 97%)      Daily     Daily     PHYSICAL EXAM:     GENERAL:  Appears stated age, well-groomed, well-nourished, no distress  HEENT:  NC/AT,  conjunctivae clear and pink  CHEST:  Full & symmetric excursion, no increased effort  HEART:  Regular rhythm, S1, S2, no murmur/rub/S3/S4  ABDOMEN:  Soft, +mild epigastric-tenderness, non-distended  RECTAL: +internal and external hemorrhoids; +no melena or hematochezia on smear  EXTREMITIES:  no cyanosis,clubbing or edema  SKIN:  No rash/erythema/ecchymoses/petechiae/wounds/abscess/warm/dry  NEURO:  Alert, orientedx3      LABS:                        4.6    9.06  )-----------( 267      ( 18 Aug 2023 14:19 )             18.5     08-18    140  |  105  |  8   ----------------------------<  96  4.2   |  22  |  0.54    Ca    8.9      18 Aug 2023 14:19    TPro  7.6  /  Alb  4.4  /  TBili  0.2  /  DBili  x   /  AST  10  /  ALT  9<L>  /  AlkPhos  53  08-18    LIVER FUNCTIONS - ( 18 Aug 2023 14:19 )  Alb: 4.4 g/dL / Pro: 7.6 g/dL / ALK PHOS: 53 U/L / ALT: 9 U/L / AST: 10 U/L / GGT: x           PT/INR - ( 18 Aug 2023 14:19 )   PT: 11.0 sec;   INR: 1.05 ratio         PTT - ( 18 Aug 2023 14:19 )  PTT:26.7 sec  Urinalysis Basic - ( 18 Aug 2023 14:19 )    Color: x / Appearance: x / SG: x / pH: x  Gluc: 96 mg/dL / Ketone: x  / Bili: x / Urobili: x   Blood: x / Protein: x / Nitrite: x   Leuk Esterase: x / RBC: x / WBC x   Sq Epi: x / Non Sq Epi: x / Bacteria: x          Imaging:              Chief Complaint:  Patient is a 47y old  Female who presents with a chief complaint of      #[id number] was used.    HPI: 48yo Kyrgyz speaking F with PMH chronic anemia (dx at age 14; on iron supplements), chronic intermittent constipation, gastric sleeve (done 2020) who presents to the ED after being referred to the ED for acute worsening of chronic anemia. Reports 1 month of fatigue, drowsiness, shortness of breath; had OP blood work done by PCP and was told to come to the ED for further eval. Report intermittent vomiting 2/2 known food triggers such as fruits, watermelon, fatty/greasy foods; last vomited yesterday after eating watermelon. Has also been reporting a vulvar lesion of x1 week duration. Reports history of heavy vaginal bleeding with menses- changes pads 5-7x/day during first few days of menses which she states is bloody with dark clots. Reports chronic nightly NSAID use (advil) few times per week that she takes to help her sleep when she has migraines lasting several days. Reports epigastric abd pain x 1 month. GI consulted for anemia w/o overt GIB.    Otherwise, patient denies weight loss, nausea, vomiting, diarrhea, melena, hematemesis, hematochezia, change in stool caliber, or family history of GI-related cancers.    LMP 7/31/2023    Allergies:  No Known Allergies      Home Medications:    Hospital Medications:      PMHX/PSHX:      Family history:   Denies any family history of GI-related disease or cancers.    Social History:   ETOH: denies  Tobacco: denies   Illicit drug use: denies    ROS: 14 point ROS negative unless otherwise stated in HPI      Vital Signs:  Vital Signs Last 24 Hrs  T(C): 37.1 (18 Aug 2023 11:28), Max: 37.1 (18 Aug 2023 11:28)  T(F): 98.8 (18 Aug 2023 11:28), Max: 98.8 (18 Aug 2023 11:28)  HR: 75 (18 Aug 2023 11:28) (75 - 75)  BP: 146/84 (18 Aug 2023 11:28) (146/84 - 146/84)  BP(mean): --  RR: 20 (18 Aug 2023 11:28) (20 - 20)  SpO2: 97% (18 Aug 2023 11:28) (97% - 97%)      Daily     Daily     PHYSICAL EXAM:     GENERAL:  Appears stated age, well-groomed, well-nourished, no distress  HEENT:  NC/AT,  conjunctivae clear and pink  CHEST:  Full & symmetric excursion, no increased effort  HEART:  Regular rhythm, S1, S2, no murmur/rub/S3/S4  ABDOMEN:  Soft, +mild epigastric-tenderness, non-distended  RECTAL: +internal and external hemorrhoids; +no melena or hematochezia on smear  EXTREMITIES:  no cyanosis,clubbing or edema  SKIN:  No rash/erythema/ecchymoses/petechiae/wounds/abscess/warm/dry  NEURO:  Alert, orientedx3      LABS:                        4.6    9.06  )-----------( 267      ( 18 Aug 2023 14:19 )             18.5     08-18    140  |  105  |  8   ----------------------------<  96  4.2   |  22  |  0.54    Ca    8.9      18 Aug 2023 14:19    TPro  7.6  /  Alb  4.4  /  TBili  0.2  /  DBili  x   /  AST  10  /  ALT  9<L>  /  AlkPhos  53  08-18    LIVER FUNCTIONS - ( 18 Aug 2023 14:19 )  Alb: 4.4 g/dL / Pro: 7.6 g/dL / ALK PHOS: 53 U/L / ALT: 9 U/L / AST: 10 U/L / GGT: x           PT/INR - ( 18 Aug 2023 14:19 )   PT: 11.0 sec;   INR: 1.05 ratio         PTT - ( 18 Aug 2023 14:19 )  PTT:26.7 sec  Urinalysis Basic - ( 18 Aug 2023 14:19 )    Color: x / Appearance: x / SG: x / pH: x  Gluc: 96 mg/dL / Ketone: x  / Bili: x / Urobili: x   Blood: x / Protein: x / Nitrite: x   Leuk Esterase: x / RBC: x / WBC x   Sq Epi: x / Non Sq Epi: x / Bacteria: x          Imaging:

## 2023-08-18 NOTE — ED ADULT TRIAGE NOTE - CHIEF COMPLAINT QUOTE
sent by md for blood transfusion for low count hx of anemia pt state sob symptoms for last few months pt states some lumps to vaginal area worsening cream md gave her not working pt took advil for headache yesterday no relief

## 2023-08-18 NOTE — CONSULT NOTE ADULT - ATTENDING COMMENTS
Obstetrical  8am-6pm:  Patient neither seen nor examined by me.  Agree with above resident note.  Toshia HINOJOSA

## 2023-08-18 NOTE — ED PROVIDER NOTE - CLINICAL SUMMARY MEDICAL DECISION MAKING FREE TEXT BOX
anemia possibly related to vaginal bleeding, however pt not on menstrual period,   pt w/possible GI bleed, given dark stools, no brbpr, stool is brown,  pt w/ lesion on the labia- concern for possible abscess, vs cellulitis,  GYN consulted,   pt hd stable- pt to be admitted to anemia will give blood,

## 2023-08-18 NOTE — PATIENT PROFILE ADULT - FALL HARM RISK - HARM RISK INTERVENTIONS
Communicate Risk of Fall with Harm to all staff/Reinforce activity limits and safety measures with patient and family/Tailored Fall Risk Interventions/Visual Cue: Yellow wristband and red socks/Bed in lowest position, wheels locked, appropriate side rails in place/Call bell, personal items and telephone in reach/Instruct patient to call for assistance before getting out of bed or chair/Non-slip footwear when patient is out of bed/[place] to call system/Physically safe environment - no spills, clutter or unnecessary equipment/Purposeful Proactive Rounding/Room/bathroom lighting operational, light cord in reach Communicate Risk of Fall with Harm to all staff/Reinforce activity limits and safety measures with patient and family/Tailored Fall Risk Interventions/Visual Cue: Yellow wristband and red socks/Bed in lowest position, wheels locked, appropriate side rails in place/Call bell, personal items and telephone in reach/Instruct patient to call for assistance before getting out of bed or chair/Non-slip footwear when patient is out of bed/[place] to call system/Physically safe environment - no spills, clutter or unnecessary equipment/Purposeful Proactive Rounding/Room/bathroom lighting operational, light cord in reach Communicate Risk of Fall with Harm to all staff/Reinforce activity limits and safety measures with patient and family/Tailored Fall Risk Interventions/Visual Cue: Yellow wristband and red socks/Bed in lowest position, wheels locked, appropriate side rails in place/Call bell, personal items and telephone in reach/Instruct patient to call for assistance before getting out of bed or chair/Non-slip footwear when patient is out of bed/[place] to call system/Physically safe environment - no spills, clutter or unnecessary equipment/Purposeful Proactive Rounding/Room/bathroom lighting operational, light cord in reach

## 2023-08-18 NOTE — CHART NOTE - NSCHARTNOTEFT_GEN_A_CORE
Notified by lab for critical value, Hgb 5.9. Patient admitted with anemia s/p 1 U prbc. Developed T 100.4F (rectally) 1 hour after unit completed, transfusion reaction work up intitated by JEREMY Bagley. Notified by lab for critical value, Hgb 5.9. Patient admitted with anemia s/p 1 U prbc. Developed T 100.4F (rectally) 1 hour after unit completed, transfusion reaction work up initiated by JEREMY Bagley. Patient with appropriate response following 1U transfusion, asymptomatic without complaints, no active signs of bleeding. Additional unit PRBC to be administered pending r/o transfusion reaction by blood bank. At this time, continue to monitor closely. Patient transferred to [facility], accepting ACP made aware of patient status and events and to provide continued management and care.     Irina Diaz PA-C Notified by lab for critical value, Hgb 5.9. Patient admitted with anemia s/p 1 U prbc. Developed T 100.4F (rectally) 1 hour after unit completed, transfusion reaction work up initiated by JEREMY Bagley. Patient with appropriate response following 1U transfusion, asymptomatic without complaints, no active signs of bleeding. Additional unit PRBC to be administered pending r/o transfusion reaction by blood bank. At this time, continue to monitor closely. Patient transferred to [facility], accepting ACP made aware of patient status and events and to provide continued management and care.     Irina Diaz PA-C Notified by lab for critical value, Hgb 5.9. Patient admitted with anemia s/p 1 U prbc. Developed T 100.4F (rectally) 1 hour after unit completed, transfusion reaction work up initiated by JEREMY Bagley. Patient with appropriate response following 1U transfusion, asymptomatic without complaints, no active signs of bleeding. Additional unit PRBC to be administered pending r/o transfusion reaction by blood bank. At this time, continue to monitor closely. Patient transferred to [facility], accepting ACP made aware of patient status and events and to provide continued management and care.     Irina Diaz PA-C Notified by lab for critical value, Hgb 5.9. Patient admitted with anemia s/p 1 U prbc. Became febrile T 100.4F (rectally) 1 hour after unit completed, transfusion reaction work up initiated by JEREMY Bagley. Patient with appropriate response following 1U transfusion, asymptomatic without complaints, no active signs of bleeding. Additional unit PRBC to be administered pending r/o transfusion reaction by blood bank. At this time, continue to monitor closely. Patient transferred to [facility], accepting ACP made aware of patient status and events and to provide continued management and care.     Irina Diaz PA-C Notified by lab for critical value, Hgb 5.9. Patient admitted with anemia s/p 1 U prbc. Became febrile T 100.4F (rectally) 1 hour after unit completed, transfusion reaction work up initiated by JEREMY Bagley. Patient with appropriate response following 1U transfusion, asymptomatic without complaints, no active signs of bleeding. Additional unit PRBC to be administered pending r/o transfusion reaction by blood bank. At this time, continue to monitor closely. Patient transferred to [facility], accepting ACP made aware of patient status and events and to provide continued management and care.     Irina Diaz PA-C Notified by lab for critical value, Hgb 5.9. Patient admitted with anemia s/p 1 U prbc. Became febrile T 100.4F (rectally) 1 hour after unit completed, transfusion reaction work up initiated by JEREMY Bagley. Patient with appropriate response following 1U transfusion, asymptomatic without complaints, no active signs of bleeding. Additional unit PRBC to be administered pending r/o transfusion reaction by blood bank. At this time, continue to monitor closely. Patient transferred to [facility], accepting ACP made aware of patient status and events and to provide continued management and care.     Irina Diaz PA-C

## 2023-08-18 NOTE — CONSULT NOTE ADULT - ASSESSMENT
#acute on chronic anemia  Unknown BL Hgb. Patient presents with significant anemia in the absence of overt GI bleeding.  Differential diagnosis remains broad and includes thalassemia vs menorrhagia vs chronic anemia 2/2 gastric sleeve.     Recommendations:  -trend vitals, CBC, and monitor for clinical signs of bleeding  -maintain active type and screen  -transfusion goal to maintain hemoglobin >/= 7.0  -rule out other causes for anemia [order PRE-TRANSFUSION iron studies, ferritin, vitamin B12, folate, haptoglobin, retic panel, LDH]  -avoid NSAIDs  -consider CT A/P IVC    **THIS NOTE IS NOT FINALIZED UNTIL SIGNED BY THE ATTENDING**    Sherry Ledezma MD  GI Fellow, PGY-6  Available via Microsoft Teams    NON-URGENT CONSULTS:  Please email [email or web address]  AT NIGHT AND ON WEEKENDS:  Contact on-call GI fellow via answering service ([phone]) from 5pm-8am and on weekends/holidays  MONDAY-FRIDAY 8AM-5PM:  Pager# [phone] (BENNY) or [phone] ([facility])   #acute on chronic anemia  Unknown BL Hgb. Patient presents with significant anemia in the absence of overt GI bleeding.  Differential diagnosis remains broad and includes thalassemia vs menorrhagia vs chronic anemia 2/2 gastric sleeve.     Recommendations:  -trend vitals, CBC, and monitor for clinical signs of bleeding  -maintain active type and screen  -transfusion goal to maintain hemoglobin >/= 7.0  -rule out other causes for anemia [order PRE-TRANSFUSION iron studies, ferritin, vitamin B12, folate, haptoglobin, retic panel, LDH]  -avoid NSAIDs  -consider CT A/P IVC    **THIS NOTE IS NOT FINALIZED UNTIL SIGNED BY THE ATTENDING**    Sherry Ledezma MD  GI Fellow, PGY-6  Available via Microsoft Teams    NON-URGENT CONSULTS:  Please email [email or web address]  AT NIGHT AND ON WEEKENDS:  Contact on-call GI fellow via answering service ([phone]) from 5pm-8am and on weekends/holidays  MONDAY-FRIDAY 8AM-5PM:  Pager# [phone] (BENNY) or [phone] ([facility])   #acute on chronic anemia  Unknown BL Hgb. Patient presents with significant anemia in the absence of overt GI bleeding.  Differential diagnosis remains broad and includes thalassemia vs menorrhagia vs chronic anemia 2/2 gastric sleeve.     Recommendations:  -trend vitals, CBC, and monitor for clinical signs of bleeding  -maintain active type and screen  -transfusion goal to maintain hemoglobin >/= 7.0  -rule out other causes for anemia [order PRE-TRANSFUSION iron studies, ferritin, vitamin B12, folate, haptoglobin, retic panel, LDH]  -avoid NSAIDs  -consider CT A/P IVC    **THIS NOTE IS NOT FINALIZED UNTIL SIGNED BY THE ATTENDING**    Sherry Ledezma MD  GI Fellow, PGY-6  Available via Microsoft Teams    NON-URGENT CONSULTS:  Please email [email or web address]  AT NIGHT AND ON WEEKENDS:  Contact on-call GI fellow via answering service ([phone]) from 5pm-8am and on weekends/holidays  MONDAY-FRIDAY 8AM-5PM:  Pager# [phone] (BENNY) or [phone] ([facility])   46yo Chinese speaking F with PMH chronic anemia (dx at age 14; on iron supplements), chronic intermittent constipation, gastric sleeve (done 2020; with 100 lb weight loss) who presents to the ED after being referred to the ED for acute worsening of chronic anemia. Reports 1 month of fatigue, drowsiness, shortness of breath; had OP blood work done by PCP and was told to come to the ED for further eval. Report intermittent vomiting 2/2 known food triggers such as fruits, watermelon, fatty/greasy foods; last vomited yesterday after eating watermelon. Has also been reporting a vulvar lesion of x1 week duration. Reports history of heavy vaginal bleeding with menses- changes pads 5-7x/day during first few days of menses which she states is bloody with dark clots. Reports chronic nightly NSAID use (advil) few times per week that she takes to help her sleep when she has migraines lasting several days. Reports epigastric abd pain x 1 month. GI consulted for anemia w/o overt GIB.    #acute on chronic anemia  #microcytic anemia  #h/o gastric sleeve (2020)  Unknown BL Hgb. Chronic anemia since age 14. Patient presents with significant anemia in the absence of overt GI bleeding.  Differential diagnosis remains broad and includes thalassemia vs menorrhagia vs chronic anemia 2/2 gastric sleeve vs PUD from NSAID use.    #internal and external hemorrhoids  #chronic intermittent constipation - last BM 8/15    Recommendations:  -trend vitals, CBC, and monitor for clinical signs of bleeding  -maintain active type and screen  -transfusion goal to maintain hemoglobin >/= 7.0  -rule out other causes for anemia [order PRE-TRANSFUSION iron studies, ferritin, vitamin B12, folate, haptoglobin, retic panel, LDH]  -appreciate GYN input re: menorrhagia  -avoid NSAIDs  -CT A/P IVC  -if above workup is unrevealing can consider inpt c-scope early next week- would likely need 2 day prep given chronic constipation  -miralax BID+ senna 17.2 mg QHS  for bowel regimen     **THIS NOTE IS NOT FINALIZED UNTIL SIGNED BY THE ATTENDING**    Sherry Ledezma MD  GI Fellow, PGY-6  Available via Microsoft Teams    NON-URGENT CONSULTS:  Please email [email or web address]  [email or web address]  AT NIGHT AND ON WEEKENDS:  Contact on-call GI fellow via answering service ([phone]) from 5pm-8am and on weekends/holidays  MONDAY-FRIDAY 8AM-5PM:  Pager# [phone] (CARO[facility]) or [phone] ([facility])   46yo Belarusian speaking F with PMH chronic anemia (dx at age 14; on iron supplements), chronic intermittent constipation, gastric sleeve (done 2020; with 100 lb weight loss) who presents to the ED after being referred to the ED for acute worsening of chronic anemia. Reports 1 month of fatigue, drowsiness, shortness of breath; had OP blood work done by PCP and was told to come to the ED for further eval. Report intermittent vomiting 2/2 known food triggers such as fruits, watermelon, fatty/greasy foods; last vomited yesterday after eating watermelon. Has also been reporting a vulvar lesion of x1 week duration. Reports history of heavy vaginal bleeding with menses- changes pads 5-7x/day during first few days of menses which she states is bloody with dark clots. Reports chronic nightly NSAID use (advil) few times per week that she takes to help her sleep when she has migraines lasting several days. Reports epigastric abd pain x 1 month. GI consulted for anemia w/o overt GIB.    #acute on chronic anemia  #microcytic anemia  #h/o gastric sleeve (2020)  Unknown BL Hgb. Chronic anemia since age 14. Patient presents with significant anemia in the absence of overt GI bleeding.  Differential diagnosis remains broad and includes thalassemia vs menorrhagia vs chronic anemia 2/2 gastric sleeve vs PUD from NSAID use.    #internal and external hemorrhoids  #chronic intermittent constipation - last BM 8/15    Recommendations:  -trend vitals, CBC, and monitor for clinical signs of bleeding  -maintain active type and screen  -transfusion goal to maintain hemoglobin >/= 7.0  -rule out other causes for anemia [order PRE-TRANSFUSION iron studies, ferritin, vitamin B12, folate, haptoglobin, retic panel, LDH]  -appreciate GYN input re: menorrhagia  -avoid NSAIDs  -CT A/P IVC  -if above workup is unrevealing can consider inpt c-scope early next week- would likely need 2 day prep given chronic constipation  -miralax BID+ senna 17.2 mg QHS  for bowel regimen     **THIS NOTE IS NOT FINALIZED UNTIL SIGNED BY THE ATTENDING**    Sherry Ledezma MD  GI Fellow, PGY-6  Available via Microsoft Teams    NON-URGENT CONSULTS:  Please email [email or web address]  [email or web address]  AT NIGHT AND ON WEEKENDS:  Contact on-call GI fellow via answering service ([phone]) from 5pm-8am and on weekends/holidays  MONDAY-FRIDAY 8AM-5PM:  Pager# [phone] (CAROLE) or [phone] ([facility])   48yo Mohawk speaking F with PMH chronic anemia (dx at age 14; on iron supplements), chronic intermittent constipation, gastric sleeve (done 2020; with 100 lb weight loss) who presents to the ED after being referred to the ED for acute worsening of chronic anemia. Reports 1 month of fatigue, drowsiness, shortness of breath; had OP blood work done by PCP and was told to come to the ED for further eval. Report intermittent vomiting 2/2 known food triggers such as fruits, watermelon, fatty/greasy foods; last vomited yesterday after eating watermelon. Has also been reporting a vulvar lesion of x1 week duration. Reports history of heavy vaginal bleeding with menses- changes pads 5-7x/day during first few days of menses which she states is bloody with dark clots. Reports chronic nightly NSAID use (advil) few times per week that she takes to help her sleep when she has migraines lasting several days. Reports epigastric abd pain x 1 month. GI consulted for anemia w/o overt GIB.    #acute on chronic anemia  #microcytic anemia  #h/o gastric sleeve (2020)  Unknown BL Hgb. Chronic anemia since age 14. Patient presents with significant anemia in the absence of overt GI bleeding.  Differential diagnosis remains broad and includes thalassemia vs menorrhagia vs chronic anemia 2/2 gastric sleeve vs PUD from NSAID use.    #internal and external hemorrhoids  #chronic intermittent constipation - last BM 8/15    Recommendations:  -trend vitals, CBC, and monitor for clinical signs of bleeding  -maintain active type and screen  -transfusion goal to maintain hemoglobin >/= 7.0  -rule out other causes for anemia [order PRE-TRANSFUSION iron studies, ferritin, vitamin B12, folate, haptoglobin, retic panel, LDH]  -appreciate GYN input re: menorrhagia  -avoid NSAIDs  -CT A/P IVC  -if above workup is unrevealing can consider inpt c-scope early next week- would likely need 2 day prep given chronic constipation  -miralax BID+ senna 17.2 mg QHS  for bowel regimen     **THIS NOTE IS NOT FINALIZED UNTIL SIGNED BY THE ATTENDING**    Sherry Ledezma MD  GI Fellow, PGY-6  Available via Microsoft Teams    NON-URGENT CONSULTS:  Please email [email or web address]  [email or web address]  AT NIGHT AND ON WEEKENDS:  Contact on-call GI fellow via answering service ([phone]) from 5pm-8am and on weekends/holidays  MONDAY-FRIDAY 8AM-5PM:  Pager# [phone] (CAROLE) or [phone] ([facility])

## 2023-08-18 NOTE — ED ADULT NURSE REASSESSMENT NOTE - NS ED NURSE REASSESS COMMENT FT1
TORY Carrion at bedside to assess pt as pt developed fever s/p transfusion. second unit to be held. pt c/o chills at this time no other signs of transfusion rxn. pt endorsing having fevers intermittent over the past few days.

## 2023-08-18 NOTE — ED PROVIDER NOTE - NS ED MD DISPO DIVISION
[facility]

## 2023-08-18 NOTE — H&P ADULT - MUSCULOSKELETAL
ROM intact/no joint swelling/no joint erythema/no joint warmth/strength 5/5 bilateral upper extremities

## 2023-08-19 LAB
APPEARANCE UR: CLEAR — SIGNIFICANT CHANGE UP
BILIRUB UR-MCNC: NEGATIVE — SIGNIFICANT CHANGE UP
COLOR SPEC: SIGNIFICANT CHANGE UP
DIFF PNL FLD: NEGATIVE — SIGNIFICANT CHANGE UP
FERRITIN SERPL-MCNC: 2 NG/ML — LOW (ref 15–150)
FERRITIN SERPL-MCNC: 7 NG/ML — LOW (ref 15–150)
FOLATE SERPL-MCNC: 12.3 NG/ML — SIGNIFICANT CHANGE UP
FOLATE SERPL-MCNC: 4.7 NG/ML — SIGNIFICANT CHANGE UP
GLUCOSE UR QL: NEGATIVE — SIGNIFICANT CHANGE UP
HAPTOGLOB SERPL-MCNC: 178 MG/DL — SIGNIFICANT CHANGE UP (ref 34–200)
HAPTOGLOB SERPL-MCNC: 179 MG/DL — SIGNIFICANT CHANGE UP (ref 34–200)
HCT VFR BLD CALC: 25.4 % — LOW (ref 34.5–45)
HCT VFR BLD CALC: 25.5 % — LOW (ref 34.5–45)
HGB BLD-MCNC: 7.1 G/DL — LOW (ref 11.5–15.5)
IRON SATN MFR SERPL: 24 UG/DL — LOW (ref 30–160)
IRON SATN MFR SERPL: 33 UG/DL — SIGNIFICANT CHANGE UP (ref 30–160)
IRON SATN MFR SERPL: 7 % — LOW (ref 14–50)
KETONES UR-MCNC: NEGATIVE — SIGNIFICANT CHANGE UP
LDH SERPL L TO P-CCNC: 198 U/L — SIGNIFICANT CHANGE UP (ref 50–242)
LEUKOCYTE ESTERASE UR-ACNC: NEGATIVE — SIGNIFICANT CHANGE UP
MCHC RBC-ENTMCNC: 18.2 PG — LOW (ref 27–34)
MCHC RBC-ENTMCNC: 27.8 GM/DL — LOW (ref 32–36)
MCHC RBC-ENTMCNC: 28 GM/DL — LOW (ref 32–36)
MCV RBC AUTO: 65 FL — LOW (ref 80–100)
MCV RBC AUTO: 65.4 FL — LOW (ref 80–100)
NITRITE UR-MCNC: NEGATIVE — SIGNIFICANT CHANGE UP
NRBC # BLD: 0 /100 WBCS — SIGNIFICANT CHANGE UP (ref 0–0)
PH UR: 7.5 — SIGNIFICANT CHANGE UP (ref 5–8)
PLATELET # BLD AUTO: 196 K/UL — SIGNIFICANT CHANGE UP (ref 150–400)
PLATELET # BLD AUTO: 208 K/UL — SIGNIFICANT CHANGE UP (ref 150–400)
PROT UR-MCNC: NEGATIVE — SIGNIFICANT CHANGE UP
RBC # BLD: 3.9 M/UL — SIGNIFICANT CHANGE UP (ref 3.8–5.2)
RBC # BLD: 3.91 M/UL — SIGNIFICANT CHANGE UP (ref 3.8–5.2)
RBC # FLD: 30.5 % — HIGH (ref 10.3–14.5)
RBC # FLD: 30.8 % — HIGH (ref 10.3–14.5)
RETICS #: 28.5 K/UL — SIGNIFICANT CHANGE UP (ref 25–125)
RETICS/RBC NFR: 0.7 % — SIGNIFICANT CHANGE UP (ref 0.5–2.5)
SP GR SPEC: 1.01 — SIGNIFICANT CHANGE UP (ref 1.01–1.02)
TIBC SERPL-MCNC: 453 UG/DL — HIGH (ref 220–430)
UIBC SERPL-MCNC: 419 UG/DL — HIGH (ref 110–370)
UROBILINOGEN FLD QL: NEGATIVE — SIGNIFICANT CHANGE UP
VIT B12 SERPL-MCNC: 297 PG/ML — SIGNIFICANT CHANGE UP (ref 232–1245)
VIT B12 SERPL-MCNC: 311 PG/ML — SIGNIFICANT CHANGE UP (ref 232–1245)
WBC # BLD: 6.19 K/UL — SIGNIFICANT CHANGE UP (ref 3.8–10.5)
WBC # BLD: 8.32 K/UL — SIGNIFICANT CHANGE UP (ref 3.8–10.5)
WBC # FLD AUTO: 6.19 K/UL — SIGNIFICANT CHANGE UP (ref 3.8–10.5)
WBC # FLD AUTO: 8.32 K/UL — SIGNIFICANT CHANGE UP (ref 3.8–10.5)

## 2023-08-19 PROCEDURE — 74177 CT ABD & PELVIS W/CONTRAST: CPT | Mod: 26

## 2023-08-19 RX ORDER — FERROUS SULFATE 325(65) MG
325 TABLET ORAL DAILY
Refills: 0 | Status: DISCONTINUED | OUTPATIENT
Start: 2023-08-19 | End: 2023-08-22

## 2023-08-19 RX ADMIN — NAFCILLIN 100 MILLIGRAM(S): 10 INJECTION, POWDER, FOR SOLUTION INTRAVENOUS at 06:01

## 2023-08-19 RX ADMIN — NAFCILLIN 100 MILLIGRAM(S): 10 INJECTION, POWDER, FOR SOLUTION INTRAVENOUS at 17:11

## 2023-08-19 RX ADMIN — POLYETHYLENE GLYCOL 3350 17 GRAM(S): 17 POWDER, FOR SOLUTION ORAL at 06:01

## 2023-08-19 RX ADMIN — SENNA PLUS 2 TABLET(S): 8.6 TABLET ORAL at 22:03

## 2023-08-19 RX ADMIN — POLYETHYLENE GLYCOL 3350 17 GRAM(S): 17 POWDER, FOR SOLUTION ORAL at 17:11

## 2023-08-19 RX ADMIN — NAFCILLIN 100 MILLIGRAM(S): 10 INJECTION, POWDER, FOR SOLUTION INTRAVENOUS at 13:11

## 2023-08-19 NOTE — PROGRESS NOTE ADULT - SUBJECTIVE AND OBJECTIVE BOX
Patient is a 47y old  Female who presents with a chief complaint of Anemia (18 Aug 2023 16:27)      SUBJECTIVE / OVERNIGHT EVENTS:     MEDICATIONS  (STANDING):  nafcillin  IVPB      nafcillin  IVPB 1000 milliGRAM(s) IV Intermittent every 6 hours  polyethylene glycol 3350 17 Gram(s) Oral two times a day  senna 2 Tablet(s) Oral at bedtime    MEDICATIONS  (PRN):      CAPILLARY BLOOD GLUCOSE        I&O's Summary    19 Aug 2023 07:01  -  19 Aug 2023 13:58  --------------------------------------------------------  IN: 700 mL / OUT: 0 mL / NET: 700 mL      T(C): 36.8 (23 @ 12:34), Max: 37 (23 @ 04:46)  HR: 66 (23 @ 12:34) (66 - 76)  BP: 97/63 (23 @ 12:34) (97/63 - 112/67)  RR: 18 (23 @ 12:34) (17 - 18)  SpO2: 100% (23 @ 12:34) (98% - 100%)    PHYSICAL EXAM:  GENERAL: NAD, well-developed  HEAD:  Atraumatic, Normocephalic  EYES: EOMI, PERRLA, conjunctiva and sclera clear  NECK: Supple, No JVD  CHEST/LUNG: Clear to auscultation bilaterally; No wheeze  HEART: Regular rate and rhythm; No murmurs, rubs, or gallops  ABDOMEN: Soft, Nontender, Nondistended; Bowel sounds present  EXTREMITIES:  2+ Peripheral Pulses, No clubbing, cyanosis, or edema  PSYCH: AAOx3  NEUROLOGY: non-focal  SKIN: No rashes or lesions    LABS:                        7.1    8.32  )-----------( 196      ( 19 Aug 2023 07:23 )             25.5     08-18    140  |  105  |  8   ----------------------------<  96  4.2   |  22  |  0.54    Ca    8.9      18 Aug 2023 14:19    TPro  7.6  /  Alb  4.4  /  TBili  0.2  /  DBili  x   /  AST  10  /  ALT  9<L>  /  AlkPhos  53  08-18    PT/INR - ( 18 Aug 2023 14:19 )   PT: 11.0 sec;   INR: 1.05 ratio         PTT - ( 18 Aug 2023 14:19 )  PTT:26.7 sec      Urinalysis Basic - ( 19 Aug 2023 00:13 )    Color: Light Yellow / Appearance: Clear / S.010 / pH: x  Gluc: x / Ketone: Negative  / Bili: Negative / Urobili: Negative   Blood: x / Protein: Negative / Nitrite: Negative   Leuk Esterase: Negative / RBC: x / WBC x   Sq Epi: x / Non Sq Epi: x / Bacteria: x        RADIOLOGY & ADDITIONAL TESTS:    Imaging Personally Reviewed:    Consultant(s) Notes Reviewed:      Care Discussed with Consultants/Other Providers:

## 2023-08-19 NOTE — PROGRESS NOTE ADULT - ASSESSMENT
F with PMH chronic anemia (dx at age 14; on iron supplements), chronic intermittent constipation, gastric sleeve (done 2020) who presents to the ED after being referred to the ED for acute worsening of chronic anemia.         Anemia Transfuse prn   GI consult appreciated   Anemia work up   vit B12, Folate  levels   Hem Eval     Follicultiits Gyn consulted   recommend Dicloxacillin 500mg QID x7 days   Nafcillin

## 2023-08-20 LAB
ANION GAP SERPL CALC-SCNC: 13 MMOL/L — SIGNIFICANT CHANGE UP (ref 5–17)
BUN SERPL-MCNC: 12 MG/DL — SIGNIFICANT CHANGE UP (ref 7–23)
CALCIUM SERPL-MCNC: 8.9 MG/DL — SIGNIFICANT CHANGE UP (ref 8.4–10.5)
CHLORIDE SERPL-SCNC: 104 MMOL/L — SIGNIFICANT CHANGE UP (ref 96–108)
CO2 SERPL-SCNC: 22 MMOL/L — SIGNIFICANT CHANGE UP (ref 22–31)
CREAT SERPL-MCNC: 0.59 MG/DL — SIGNIFICANT CHANGE UP (ref 0.5–1.3)
EGFR: 112 ML/MIN/1.73M2 — SIGNIFICANT CHANGE UP
GLUCOSE SERPL-MCNC: 94 MG/DL — SIGNIFICANT CHANGE UP (ref 70–99)
GRAM STN FLD: SIGNIFICANT CHANGE UP
HCT VFR BLD CALC: 26.1 % — LOW (ref 34.5–45)
HCT VFR BLD CALC: 26.3 % — LOW (ref 34.5–45)
HGB BLD-MCNC: 7.3 G/DL — LOW (ref 11.5–15.5)
MCHC RBC-ENTMCNC: 18.3 PG — LOW (ref 27–34)
MCHC RBC-ENTMCNC: 27.8 GM/DL — LOW (ref 32–36)
MCHC RBC-ENTMCNC: 28 GM/DL — LOW (ref 32–36)
MCV RBC AUTO: 65.4 FL — LOW (ref 80–100)
MCV RBC AUTO: 65.9 FL — LOW (ref 80–100)
METHOD TYPE: SIGNIFICANT CHANGE UP
NRBC # BLD: 0 /100 WBCS — SIGNIFICANT CHANGE UP (ref 0–0)
PLATELET # BLD AUTO: 193 K/UL — SIGNIFICANT CHANGE UP (ref 150–400)
PLATELET # BLD AUTO: 200 K/UL — SIGNIFICANT CHANGE UP (ref 150–400)
POTASSIUM SERPL-MCNC: 3.8 MMOL/L — SIGNIFICANT CHANGE UP (ref 3.5–5.3)
POTASSIUM SERPL-SCNC: 3.8 MMOL/L — SIGNIFICANT CHANGE UP (ref 3.5–5.3)
RBC # BLD: 3.99 M/UL — SIGNIFICANT CHANGE UP (ref 3.8–5.2)
RBC # FLD: 30.7 % — HIGH (ref 10.3–14.5)
RBC # FLD: 31 % — HIGH (ref 10.3–14.5)
S LUGDUNENSIS DNA SNV QL NAA+NON-PROBE: SIGNIFICANT CHANGE UP
SODIUM SERPL-SCNC: 139 MMOL/L — SIGNIFICANT CHANGE UP (ref 135–145)
SPECIMEN SOURCE: SIGNIFICANT CHANGE UP
TRANSFERRIN SERPL-MCNC: 359 MG/DL — SIGNIFICANT CHANGE UP (ref 200–360)
WBC # BLD: 4.93 K/UL — SIGNIFICANT CHANGE UP (ref 3.8–10.5)
WBC # BLD: 5.05 K/UL — SIGNIFICANT CHANGE UP (ref 3.8–10.5)
WBC # FLD AUTO: 4.93 K/UL — SIGNIFICANT CHANGE UP (ref 3.8–10.5)
WBC # FLD AUTO: 5.05 K/UL — SIGNIFICANT CHANGE UP (ref 3.8–10.5)

## 2023-08-20 RX ORDER — SOD SULF/SODIUM/NAHCO3/KCL/PEG
4000 SOLUTION, RECONSTITUTED, ORAL ORAL ONCE
Refills: 0 | Status: COMPLETED | OUTPATIENT
Start: 2023-08-20 | End: 2023-08-20

## 2023-08-20 RX ADMIN — SENNA PLUS 2 TABLET(S): 8.6 TABLET ORAL at 23:03

## 2023-08-20 RX ADMIN — Medication 1 TABLET(S): at 17:08

## 2023-08-20 RX ADMIN — Medication 4000 MILLILITER(S): at 09:55

## 2023-08-20 RX ADMIN — POLYETHYLENE GLYCOL 3350 17 GRAM(S): 17 POWDER, FOR SOLUTION ORAL at 05:31

## 2023-08-20 RX ADMIN — Medication 325 MILLIGRAM(S): at 11:18

## 2023-08-20 NOTE — CHART NOTE - NSCHARTNOTEFT_GEN_A_CORE
GI team planning for pan-endoscopic evaluation with EGD+Colonoscopy +/-VCE ttomorrow to further evaluate ongoing anemia     Instructions for EGD and colonoscopy  - clear liquid diet today, NPO at midnight  - please ensure golytely is completed (to be ordered by GI fellow)  - please ensure patient drinks entire prep  - please ensure morning labs are drawn at 2am, electrolytes repleted as necessary, and Hg>7      Van Cunningham, PGY-5  Gastroenterology/Hepatology Fellow  Available on Microsoft Teams   [phone] (Long Range Pager)  [phone] (Short Range Pager [facility])    After 5pm, please contact the on-call GI fellow. [phone] GI team planning for pan-endoscopic evaluation with EGD+Colonoscopy +/-VCE ttomorrow to further evaluate ongoing anemia     Instructions for EGD and colonoscopy  - clear liquid diet today, NPO at midnight  - please ensure golytely is completed (to be ordered by GI fellow)  - please ensure patient drinks entire prep  - please ensure morning labs are drawn at 2am, electrolytes repleted as necessary, and Hg>7      Van Cunningham, PGY-5  Gastroenterology/Hepatology Fellow  Available on Microsoft Teams   [phone] (Long Range Pager)  [phone] (Short Range Pager [facility])    After 5pm, please contact the on-call GI fellow. [phone] GI team planning for pan-endoscopic evaluation with EGD+Colonoscopy +/-VCE ttomorrow to further evaluate ongoing anemia     Instructions for EGD and colonoscopy  - clear liquid diet today, NPO at midnight  - please ensure golytely is completed (to be ordered by GI fellow)  - please ensure patient drinks entire prep  - please ensure morning labs are drawn at 2am, electrolytes repleted as necessary, and Hg>7      Van Cunningham, PGY-5  Gastroenterology/Hepatology Fellow  Available on Microsoft Teams   [phone] (Long Range Pager)  [phone] (Short Range Pager [facility])    After 5pm, please contact the on-call GI fellow. [phone]

## 2023-08-20 NOTE — CHART NOTE - NSCHARTNOTEFT_GEN_A_CORE
MEDICINE NP    CLIFF BLUM  47y Female    Patient is a 47y old  Female who presents with a chief complaint of Anemia (19 Aug 2023 13:58)         > Event Summary:   Spoke w/ GYN, José Miguel Noel, and informed Dicloxacillin not available, requests medication changed to PO Bactrim DS BID, and c/w Sitz Baths  Spoke w/ Dr. Blackwell and agrees to above plan, and ID c/s if febrile on rising leukocytosis.  Bactrim PO ordered.  Remains afebrile, trend wbc and temperature curve.  Endorsed to floor provider.  Attending to follow.      -Vital Signs Last 24 Hrs  T(C): 36.7 (20 Aug 2023 05:08), Max: 37.2 (19 Aug 2023 16:14)  T(F): 98 (20 Aug 2023 05:08), Max: 98.9 (19 Aug 2023 16:14)  HR: 58 (20 Aug 2023 05:08) (58 - 67)  BP: 116/73 (20 Aug 2023 05:08) (96/58 - 116/73)  RR: 18 (20 Aug 2023 05:08) (17 - 18)  SpO2: 100% (20 Aug 2023 05:08) (99% - 100%)    Parameters below as of 20 Aug 2023 05:08  Patient On (Oxygen Delivery Method): room air      EKATERINA Zhou-BC  Medicine Department  #[id number] MEDICINE NP    CLIFF BLUM  47y Female    Patient is a 47y old  Female who presents with a chief complaint of Anemia (19 Aug 2023 13:58)         > Event Summary:   Spoke w/ GYN, José Miguel Noel, and informed Dicloxacillin not available, requests medication changed to PO Bactrim DS BID, and c/w Sitz Baths  Spoke w/ Dr. Blackwell and agrees to above plan, and ID c/s if febrile on rising leukocytosis.  Bactrim PO ordered.  Remains afebrile, trend wbc and temperature curve.  Endorsed to floor provider.  Attending to follow.      -Vital Signs Last 24 Hrs  T(C): 36.7 (20 Aug 2023 05:08), Max: 37.2 (19 Aug 2023 16:14)  T(F): 98 (20 Aug 2023 05:08), Max: 98.9 (19 Aug 2023 16:14)  HR: 58 (20 Aug 2023 05:08) (58 - 67)  BP: 116/73 (20 Aug 2023 05:08) (96/58 - 116/73)  RR: 18 (20 Aug 2023 05:08) (17 - 18)  SpO2: 100% (20 Aug 2023 05:08) (99% - 100%)    Parameters below as of 20 Aug 2023 05:08  Patient On (Oxygen Delivery Method): room air      EKATERINA Zhou-BC  Medicine Department  #[id number] MEDICINE NP    CLIFF BLUM  47y Female    Patient is a 47y old  Female who presents with a chief complaint of Anemia (19 Aug 2023 13:58)         > Event Summary:   Spoke w/ GYN, José Miguel Noel, and informed Dicloxacillin not available, requests medication changed to PO Bactrim DS BID, and c/w Sitz Baths  Spoke w/ Dr. Blackwell and agrees to above plan, and ID c/s if febrile on rising leukocytosis.  Bactrim PO ordered.  Remains afebrile, trend wbc and temperature curve.  Endorsed to floor provider.  Attending to follow.      -Vital Signs Last 24 Hrs  T(C): 36.7 (20 Aug 2023 05:08), Max: 37.2 (19 Aug 2023 16:14)  T(F): 98 (20 Aug 2023 05:08), Max: 98.9 (19 Aug 2023 16:14)  HR: 58 (20 Aug 2023 05:08) (58 - 67)  BP: 116/73 (20 Aug 2023 05:08) (96/58 - 116/73)  RR: 18 (20 Aug 2023 05:08) (17 - 18)  SpO2: 100% (20 Aug 2023 05:08) (99% - 100%)    Parameters below as of 20 Aug 2023 05:08  Patient On (Oxygen Delivery Method): room air      EKATERINA Zhou-BC  Medicine Department  #[id number]

## 2023-08-20 NOTE — PROGRESS NOTE ADULT - ASSESSMENT
F with PMH chronic anemia (dx at age 14; on iron supplements), chronic intermittent constipation, gastric sleeve (done 2020) who presents to the ED after being referred to the ED for acute worsening of chronic anemia.     Bacteremia blood cx gram positive cocci   Likely  Contaminant patient afebrile       Anemia Transfuse prn   GI consult appreciated   Anemia work up   vit B12, Folate  levels   Hem Eval     Follicultiits Gyn consulted   recommend Dicloxacillin 500mg QID x7 days   Nafcillin

## 2023-08-20 NOTE — PROVIDER CONTACT NOTE (CRITICAL VALUE NOTIFICATION) - ASSESSMENT
Preliminary blood culture from 08/19 was positive for gram positive cocci in clusters in the aerobic bottle.

## 2023-08-20 NOTE — PROGRESS NOTE ADULT - SUBJECTIVE AND OBJECTIVE BOX
Patient is a 47y old  Female who presents with a chief complaint of Anemia (18 Aug 2023 16:27)      SUBJECTIVE / OVERNIGHT EVENTS: no events       T(C): 36.7 (08-20-23 @ 19:33), Max: 36.9 (08-20-23 @ 11:48)  HR: 67 (08-20-23 @ 19:33) (54 - 67)  BP: 98/62 (08-20-23 @ 19:33) (96/61 - 103/69)  RR: 18 (08-20-23 @ 19:33) (18 - 18)  SpO2: 96% (08-20-23 @ 19:33) (96% - 100%)      MEDICATIONS  (STANDING):  ferrous    sulfate 325 milliGRAM(s) Oral daily  polyethylene glycol 3350 17 Gram(s) Oral two times a day  senna 2 Tablet(s) Oral at bedtime  trimethoprim  160 mG/sulfamethoxazole 800 mG 1 Tablet(s) Oral two times a day    MEDICATIONS  (PRN):    PHYSICAL EXAM:  GENERAL: NAD, well-developed  HEAD:  Atraumatic, Normocephalic  EYES: EOMI, PERRLA, conjunctiva and sclera clear  NECK: Supple, No JVD  CHEST/LUNG: Clear to auscultation bilaterally; No wheeze  HEART: Regular rate and rhythm; No murmurs, rubs, or gallops  ABDOMEN: Soft, Nontender, Nondistended; Bowel sounds present  EXTREMITIES:  2+ Peripheral Pulses, No clubbing, cyanosis, or edema  PSYCH: AAOx3  NEUROLOGY: non-focal  SKIN: No rashes or lesions                            7.3    5.05  )-----------( 200      ( 20 Aug 2023 17:48 )             26.3               139|104|12<94  3.8|22|0.59  8.9,--,--  08-20 @ 07:37      Imaging Personally Reviewed:    Consultant(s) Notes Reviewed:      Care Discussed with Consultants/Other Providers:

## 2023-08-20 NOTE — CHART NOTE - NSCHARTNOTEFT_GEN_A_CORE
Pt w/ positive bld cultures - G+ cocci in clusters in aerobic bottle.   Pt afebrile w/ WBC: 4.3 and stable VS.   Currently being treated for vulvar folliculitis w/ dicloxacillin and anemia.   D/w Dr. Blackwell - likely contaminant, will await final cultures / sensitivities to result. Continue Dicloxacillin for now.      Vital Signs Last 24 Hrs  T(C): 36.7 (20 Aug 2023 05:08), Max: 37.2 (19 Aug 2023 16:14)  T(F): 98 (20 Aug 2023 05:08), Max: 98.9 (19 Aug 2023 16:14)  HR: 58 (20 Aug 2023 05:08) (58 - 67)  BP: 116/73 (20 Aug 2023 05:08) (96/58 - 116/73)  BP(mean): --  RR: 18 (20 Aug 2023 05:08) (18 - 18)  SpO2: 100% (20 Aug 2023 05:08) (99% - 100%)    Parameters below as of 20 Aug 2023 05:08  Patient On (Oxygen Delivery Method): room air    .Blood Blood-Peripheral  08-19 @ 07:28   Growth in aerobic bottle: Gram Positive Cocci in Clusters  Direct identification is available within approximately 3-5  hours either by Blood Panel Multiplexed PCR or Direct  MALDI-TOF. Details: [email or web address]  --  Blood Culture PCR      .Blood Blood-Peripheral  08-18 @ 21:04   No growth at 24 hours  --  -- Pt w/ positive bld cultures - G+ cocci in clusters in aerobic bottle.   Pt afebrile w/ WBC: 4.3 and stable VS.   Currently being treated for vulvar folliculitis w/ dicloxacillin and anemia.   D/w Dr. Blackwell - likely contaminant, will await final cultures / sensitivities to result. Continue Dicloxacillin for now.      Vital Signs Last 24 Hrs  T(C): 36.7 (20 Aug 2023 05:08), Max: 37.2 (19 Aug 2023 16:14)  T(F): 98 (20 Aug 2023 05:08), Max: 98.9 (19 Aug 2023 16:14)  HR: 58 (20 Aug 2023 05:08) (58 - 67)  BP: 116/73 (20 Aug 2023 05:08) (96/58 - 116/73)  BP(mean): --  RR: 18 (20 Aug 2023 05:08) (18 - 18)  SpO2: 100% (20 Aug 2023 05:08) (99% - 100%)    Parameters below as of 20 Aug 2023 05:08  Patient On (Oxygen Delivery Method): room air    .Blood Blood-Peripheral  08-19 @ 07:28   Growth in aerobic bottle: Gram Positive Cocci in Clusters  Direct identification is available within approximately 3-5  hours either by Blood Panel Multiplexed PCR or Direct  MALDI-TOF. Details: [email or web address]  --  Blood Culture PCR      .Blood Blood-Peripheral  08-18 @ 21:04   No growth at 24 hours  --  -- Pt w/ positive bld cultures - G+ cocci in clusters in aerobic bottle.   Pt afebrile w/ WBC: 4.3 and stable VS.   Currently being treated for vulvar folliculitis w/ dicloxacillin and anemia.   D/w Dr. Blackwell - likely contaminant, will await final cultures / sensitivities to result. Continue Dicloxacillin for now.      Vital Signs Last 24 Hrs  T(C): 36.7 (20 Aug 2023 05:08), Max: 37.2 (19 Aug 2023 16:14)  T(F): 98 (20 Aug 2023 05:08), Max: 98.9 (19 Aug 2023 16:14)  HR: 58 (20 Aug 2023 05:08) (58 - 67)  BP: 116/73 (20 Aug 2023 05:08) (96/58 - 116/73)  BP(mean): --  RR: 18 (20 Aug 2023 05:08) (18 - 18)  SpO2: 100% (20 Aug 2023 05:08) (99% - 100%)    Parameters below as of 20 Aug 2023 05:08  Patient On (Oxygen Delivery Method): room air    .Blood Blood-Peripheral  08-19 @ 07:28   Growth in aerobic bottle: Gram Positive Cocci in Clusters  Direct identification is available within approximately 3-5  hours either by Blood Panel Multiplexed PCR or Direct  MALDI-TOF. Details: [email or web address]  --  Blood Culture PCR      .Blood Blood-Peripheral  08-18 @ 21:04   No growth at 24 hours  --  --

## 2023-08-21 ENCOUNTER — RESULT REVIEW (OUTPATIENT)
Age: 48
End: 2023-08-21

## 2023-08-21 LAB
ANION GAP SERPL CALC-SCNC: 16 MMOL/L — SIGNIFICANT CHANGE UP (ref 5–17)
APTT BLD: 26.4 SEC — SIGNIFICANT CHANGE UP (ref 24.5–35.6)
BUN SERPL-MCNC: 11 MG/DL — SIGNIFICANT CHANGE UP (ref 7–23)
CALCIUM SERPL-MCNC: 8.8 MG/DL — SIGNIFICANT CHANGE UP (ref 8.4–10.5)
CHLORIDE SERPL-SCNC: 106 MMOL/L — SIGNIFICANT CHANGE UP (ref 96–108)
CO2 SERPL-SCNC: 20 MMOL/L — LOW (ref 22–31)
CREAT SERPL-MCNC: 0.72 MG/DL — SIGNIFICANT CHANGE UP (ref 0.5–1.3)
CULTURE RESULTS: SIGNIFICANT CHANGE UP
EGFR: 104 ML/MIN/1.73M2 — SIGNIFICANT CHANGE UP
GLUCOSE SERPL-MCNC: 80 MG/DL — SIGNIFICANT CHANGE UP (ref 70–99)
HCT VFR BLD CALC: 26.4 % — LOW (ref 34.5–45)
HGB BLD-MCNC: 7.2 G/DL — LOW (ref 11.5–15.5)
INR BLD: 1.07 RATIO — SIGNIFICANT CHANGE UP (ref 0.85–1.18)
MAGNESIUM SERPL-MCNC: 2.2 MG/DL — SIGNIFICANT CHANGE UP (ref 1.6–2.6)
MCHC RBC-ENTMCNC: 18 PG — LOW (ref 27–34)
MCHC RBC-ENTMCNC: 27.3 GM/DL — LOW (ref 32–36)
MCV RBC AUTO: 66 FL — LOW (ref 80–100)
NRBC # BLD: 0 /100 WBCS — SIGNIFICANT CHANGE UP (ref 0–0)
ORGANISM # SPEC MICROSCOPIC CNT: SIGNIFICANT CHANGE UP
PHOSPHATE SERPL-MCNC: 3.8 MG/DL — SIGNIFICANT CHANGE UP (ref 2.5–4.5)
PLATELET # BLD AUTO: 197 K/UL — SIGNIFICANT CHANGE UP (ref 150–400)
POTASSIUM SERPL-MCNC: 4.2 MMOL/L — SIGNIFICANT CHANGE UP (ref 3.5–5.3)
POTASSIUM SERPL-SCNC: 4.2 MMOL/L — SIGNIFICANT CHANGE UP (ref 3.5–5.3)
PROTHROM AB SERPL-ACNC: 11.7 SEC — SIGNIFICANT CHANGE UP (ref 9.5–13)
RBC # BLD: 4 M/UL — SIGNIFICANT CHANGE UP (ref 3.8–5.2)
RBC # FLD: 31.5 % — HIGH (ref 10.3–14.5)
SODIUM SERPL-SCNC: 142 MMOL/L — SIGNIFICANT CHANGE UP (ref 135–145)
SPECIMEN SOURCE: SIGNIFICANT CHANGE UP
WBC # BLD: 4.7 K/UL — SIGNIFICANT CHANGE UP (ref 3.8–10.5)
WBC # FLD AUTO: 4.7 K/UL — SIGNIFICANT CHANGE UP (ref 3.8–10.5)

## 2023-08-21 PROCEDURE — 45378 DIAGNOSTIC COLONOSCOPY: CPT | Mod: GC

## 2023-08-21 PROCEDURE — 43239 EGD BIOPSY SINGLE/MULTIPLE: CPT | Mod: GC

## 2023-08-21 PROCEDURE — 88305 TISSUE EXAM BY PATHOLOGIST: CPT | Mod: 26

## 2023-08-21 RX ORDER — IRON SUCROSE 20 MG/ML
100 INJECTION, SOLUTION INTRAVENOUS ONCE
Refills: 0 | Status: COMPLETED | OUTPATIENT
Start: 2023-08-21 | End: 2023-08-21

## 2023-08-21 RX ORDER — IRON SUCROSE 20 MG/ML
100 INJECTION, SOLUTION INTRAVENOUS ONCE
Refills: 0 | Status: COMPLETED | OUTPATIENT
Start: 2023-08-22 | End: 2023-08-22

## 2023-08-21 RX ORDER — PREGABALIN 225 MG/1
1000 CAPSULE ORAL ONCE
Refills: 0 | Status: COMPLETED | OUTPATIENT
Start: 2023-08-21 | End: 2023-08-21

## 2023-08-21 RX ADMIN — IRON SUCROSE 210 MILLIGRAM(S): 20 INJECTION, SOLUTION INTRAVENOUS at 16:58

## 2023-08-21 RX ADMIN — SENNA PLUS 2 TABLET(S): 8.6 TABLET ORAL at 21:09

## 2023-08-21 RX ADMIN — PREGABALIN 1000 MICROGRAM(S): 225 CAPSULE ORAL at 16:59

## 2023-08-21 RX ADMIN — Medication 1 TABLET(S): at 18:35

## 2023-08-21 NOTE — CONSULT NOTE ADULT - ASSESSMENT
47F with chronic anemia, constipation p/w worsened anemia    #Anemia, microcytic  - severe iron deficiency despite regular oral iron use; hx of gastric sleeve  - denies heavy MP; EGD/colonoscopy are pending  - GI is following  - s/p 2 unit PRBC  - will dose IV iron x 1, blood cx is contaminant per medicine  - will need outpt FU for further IV iron  - folate is ok  - B12 is also low normal- will dose IM B12- will need further doses as outpatient    DC planning per Medicine    FU in office Dr Morrison [phone] 47F with chronic anemia, constipation p/w worsened anemia    #Anemia, microcytic  - severe iron deficiency despite regular oral iron use; hx of gastric sleeve  - denies heavy MP; EGD/colonoscopy are pending  - GI is following  - s/p 2 unit PRBC  - will dose IV iron x 1, blood cx is contaminant per medicine  - will need outpt FU for further IV iron  - folate is ok  - B12 is also low normal- will dose IM B12- will need further doses as outpatient    DC planning per Medicine    FU in office Dr Morrison [phone] 47F with chronic anemia, constipation p/w worsened anemia    #Anemia, microcytic  - severe iron deficiency despite regular oral iron use; hx of gastric sleeve  - denies heavy MP; EGD/colonoscopy are pending  - GI is following  - s/p 2 unit PRBC  - will dose IV iron x 1, blood cx is contaminant per medicine  - will need outpt FU for further IV iron  - folate is ok  - B12 is also low normal- will dose IM B12- will need further doses as outpatient    DC planning per Medicine    FU in office Dr Morrison [phone]

## 2023-08-21 NOTE — PRE PROCEDURE NOTE - PRE PROCEDURE EVALUATION
Attending Physician:    Dr Feldman                         Procedure: EGD/Colonoscopy    Indication for Procedure: anemia  ________________________________________________________  PAST MEDICAL & SURGICAL HISTORY:    ALLERGIES:  No Known Allergies    HOME MEDICATIONS:  cephalexin 500 mg oral tablet: 1 tab(s) orally 4 times a day  cyanocobalamin 1000 mcg oral tablet: 1 tab(s) orally once a day    AICD/PPM: [ ] yes   [x ] no    PERTINENT LAB DATA:                        7.2    4.70  )-----------( 197      ( 21 Aug 2023 07:46 )             26.4     08-21    142  |  106  |  11  ----------------------------<  80  4.2   |  20<L>  |  0.72    Ca    8.8      21 Aug 2023 07:46  Phos  3.8     08-21  Mg     2.2     08-21      PT/INR - ( 21 Aug 2023 07:46 )   PT: 11.7 sec;   INR: 1.07 ratio         PTT - ( 21 Aug 2023 07:46 )  PTT:26.4 sec            PHYSICAL EXAMINATION:    T(C): 36.4  HR: 57  BP: 136/72  RR: 15  SpO2: 100%    Constitutional: NAD    Neck:  No JVD  Respiratory: no respiratory distress   Cardiovascular: RRR  Extremities: No peripheral edema  Neurological: A/O x 3, no focal deficits        COMMENTS:    The patient is a suitable candidate for the planned procedure unless box checked [ ]  No, explain:

## 2023-08-21 NOTE — CONSULT NOTE ADULT - SUBJECTIVE AND OBJECTIVE BOX
Patient is a 47y old  Female who presents with a chief complaint of Anemia (20 Aug 2023 20:48)      HPI:  47F with PMH chronic anemia (dx at age 14; on iron supplements), chronic intermittent constipation, gastric sleeve p/w  worsening of chronic anemia. Reports 1 month of fatigue, drowsiness, shortness of breath; had OP blood work done by PCP and was told to come to the ED for further eval. Report intermittent vomiting 2/2 known food triggers such as fruits, watermelon, fatty/greasy foods; last vomited yesterday after eating watermelon  (18 Aug 2023 16:27)    pt currently feeling ok, slight abdominal pain is improved; no f/c, no cp/dyspnea, no n/v; no bleeding, reports periods are regular and not heavy    no prior blood transfusions or IV iron    PAST MEDICAL & SURGICAL HISTORY:  Anemia, chronic    SOCIAL HISTORY:  Smoking - Non smoker   Alcohol - no  Drugs - No drug use  lives with daughter and grandson    FAMILY HISTORY:  M- 60s, alive, DM  F- 70s- alive, CVA    MEDICATIONS  (STANDING):  ferrous    sulfate 325 milliGRAM(s) Oral daily  iron sucrose IVPB 100 milliGRAM(s) IV Intermittent once  polyethylene glycol 3350 17 Gram(s) Oral two times a day  senna 2 Tablet(s) Oral at bedtime  trimethoprim  160 mG/sulfamethoxazole 800 mG 1 Tablet(s) Oral two times a day    MEDICATIONS  (PRN):      Allergies    No Known Allergies    Intolerances    ROS  gen- no f/c, weight stable  heent- no epistaxis/gingival bleed   cv- no chest pain  resp- no dyspnea, no cough  gi- no n/v, some abd pain improved, no melena/brbpr  gu- no heavy periods  neuro- no dizziness, no numbness or tingling  heme- no bleeding, no lumps  skin- folliculitis  ROS otherwise reviewed and negative    Vital Signs Last 24 Hrs  T(C): 36.2 (21 Aug 2023 12:20), Max: 36.8 (21 Aug 2023 00:37)  T(F): 97.2 (21 Aug 2023 12:20), Max: 98.3 (21 Aug 2023 00:37)  HR: 54 (21 Aug 2023 12:20) (44 - 67)  BP: 112/57 (21 Aug 2023 12:20) (96/61 - 136/72)  BP(mean): --  RR: 18 (21 Aug 2023 12:20) (12 - 18)  SpO2: 100% (21 Aug 2023 12:20) (96% - 100%)    Parameters below as of 21 Aug 2023 12:20  Patient On (Oxygen Delivery Method): room air        PHYSICAL EXAM  General: adult in NAD  HEENT: clear oropharynx, anicteric sclera, pink conjunctiva  Neck: supple  CV: normal S1/S2   Lungs: clear to auscultation, no wheezes, no rales  Abdomen: soft non-tender non-distended, no hepatosplenomegaly, positive bowel sounds  Ext: no clubbing cyanosis or edema  Skin: no rashes and no petechiae  Lymph Nodes: No LAD in axillae, neck  Neuro: alert and oriented X 3, no focal deficits    LABS:                          7.2    4.70  )-----------( 197      ( 21 Aug 2023 07:46 )             26.4         Mean Cell Volume : 66.0 fl  Mean Cell Hemoglobin : 18.0 pg  Mean Cell Hemoglobin Concentration : 27.3 gm/dL  Auto Neutrophil # : x  Auto Lymphocyte # : x  Auto Monocyte # : x  Auto Eosinophil # : x  Auto Basophil # : x  Auto Neutrophil % : x  Auto Lymphocyte % : x  Auto Monocyte % : x  Auto Eosinophil % : x  Auto Basophil % : x      Serial CBC's  08-21 @ 07:46  Hct-26.4 / Hgb-7.2 / Plat-197 / RBC-4.00 / WBC-4.70  Serial CBC's  08-20 @ 17:48  Hct-26.3 / Hgb-7.3 / Plat-200 / RBC-3.99 / WBC-5.05  Serial CBC's  08-20 @ 07:37  Hct-26.1 / Hgb-7.3 / Plat-193 / RBC-3.99 / WBC-4.93  Serial CBC's  08-19 @ 19:25  Hct-25.4 / Hgb-7.1 / Plat-208 / RBC-3.91 / WBC-6.19  Serial CBC's  08-19 @ 07:23  Hct-25.5 / Hgb-7.1 / Plat-196 / RBC-3.90 / WBC-8.32  Serial CBC's  08-18 @ 22:40  Hct--- / Hgb--- / Plat--- / RBC-3.43 / WBC---  Serial CBC's  08-18 @ 21:15  Hct-21.6 / Hgb-5.9 / Plat-197 / RBC-3.56 / WBC-7.70  Serial CBC's  08-18 @ 14:19  Hct-18.5 / Hgb-4.6 / Plat-267 / RBC-3.28 / WBC-9.06      08-21    142  |  106  |  11  ----------------------------<  80  4.2   |  20<L>  |  0.72    Ca    8.8      21 Aug 2023 07:46  Phos  3.8     08-21  Mg     2.2     08-21        PT/INR - ( 21 Aug 2023 07:46 )   PT: 11.7 sec;   INR: 1.07 ratio         PTT - ( 21 Aug 2023 07:46 )  PTT:26.4 sec    Reticulocyte Percent: 0.7 % (08-19 @ 07:23)  Ferritin: 7 ng/mL (08-19 @ 07:22)  Vitamin B12, Serum: 311 pg/mL (08-19 @ 07:22)  Folate, Serum: 4.7 ng/mL (08-19 @ 07:22)  Iron - Total Binding Capacity.: 453 ug/dL (08-18 @ 22:40)  Ferritin: 2 ng/mL (08-18 @ 22:40)  Vitamin B12, Serum: 297 pg/mL (08-18 @ 22:40)  Folate, Serum: 12.3 ng/mL (08-18 @ 22:40)  Reticulocyte Percent: 1.1 % (08-18 @ 22:40)          08-19 @ 07:22    ldh1 --  haptoglobin 179  ALECIA--  uric acid--  08-18 @ 22:40    ldh1 --  haptoglobin 178  ALECIA--  uric acid--        Radiology:  < from: CT Abdomen and Pelvis w/ IV Cont (08.19.23 @ 14:23) >  IMPRESSION:    Mildly dilated loops of small bowel in the left hemiabdomen without   obstruction.    Mild distention of the gastric sleeve with retained internal contents.    < end of copied text >

## 2023-08-22 ENCOUNTER — TRANSCRIPTION ENCOUNTER (OUTPATIENT)
Age: 48
End: 2023-08-22

## 2023-08-22 VITALS
DIASTOLIC BLOOD PRESSURE: 65 MMHG | OXYGEN SATURATION: 96 % | RESPIRATION RATE: 18 BRPM | HEART RATE: 78 BPM | SYSTOLIC BLOOD PRESSURE: 97 MMHG

## 2023-08-22 LAB
HCT VFR BLD CALC: 25.4 % — LOW (ref 34.5–45)
HGB BLD-MCNC: 7.1 G/DL — LOW (ref 11.5–15.5)
MCHC RBC-ENTMCNC: 18.3 PG — LOW (ref 27–34)
MCHC RBC-ENTMCNC: 28 GM/DL — LOW (ref 32–36)
MCV RBC AUTO: 65.3 FL — LOW (ref 80–100)
NRBC # BLD: 0 /100 WBCS — SIGNIFICANT CHANGE UP (ref 0–0)
PLATELET # BLD AUTO: 208 K/UL — SIGNIFICANT CHANGE UP (ref 150–400)
RBC # BLD: 3.89 M/UL — SIGNIFICANT CHANGE UP (ref 3.8–5.2)
RBC # FLD: 32.4 % — HIGH (ref 10.3–14.5)
WBC # BLD: 5.82 K/UL — SIGNIFICANT CHANGE UP (ref 3.8–10.5)
WBC # FLD AUTO: 5.82 K/UL — SIGNIFICANT CHANGE UP (ref 3.8–10.5)

## 2023-08-22 PROCEDURE — 80048 BASIC METABOLIC PNL TOTAL CA: CPT

## 2023-08-22 PROCEDURE — 82947 ASSAY GLUCOSE BLOOD QUANT: CPT

## 2023-08-22 PROCEDURE — 85014 HEMATOCRIT: CPT

## 2023-08-22 PROCEDURE — 71045 X-RAY EXAM CHEST 1 VIEW: CPT

## 2023-08-22 PROCEDURE — 96375 TX/PRO/DX INJ NEW DRUG ADDON: CPT

## 2023-08-22 PROCEDURE — 85027 COMPLETE CBC AUTOMATED: CPT

## 2023-08-22 PROCEDURE — 84484 ASSAY OF TROPONIN QUANT: CPT

## 2023-08-22 PROCEDURE — 82728 ASSAY OF FERRITIN: CPT

## 2023-08-22 PROCEDURE — 85730 THROMBOPLASTIN TIME PARTIAL: CPT

## 2023-08-22 PROCEDURE — 82803 BLOOD GASES ANY COMBINATION: CPT

## 2023-08-22 PROCEDURE — 74177 CT ABD & PELVIS W/CONTRAST: CPT

## 2023-08-22 PROCEDURE — 84466 ASSAY OF TRANSFERRIN: CPT

## 2023-08-22 PROCEDURE — 83880 ASSAY OF NATRIURETIC PEPTIDE: CPT

## 2023-08-22 PROCEDURE — P9016: CPT

## 2023-08-22 PROCEDURE — 83605 ASSAY OF LACTIC ACID: CPT

## 2023-08-22 PROCEDURE — 83010 ASSAY OF HAPTOGLOBIN QUANT: CPT

## 2023-08-22 PROCEDURE — 82330 ASSAY OF CALCIUM: CPT

## 2023-08-22 PROCEDURE — 81003 URINALYSIS AUTO W/O SCOPE: CPT

## 2023-08-22 PROCEDURE — 83550 IRON BINDING TEST: CPT

## 2023-08-22 PROCEDURE — 82272 OCCULT BLD FECES 1-3 TESTS: CPT

## 2023-08-22 PROCEDURE — 36415 COLL VENOUS BLD VENIPUNCTURE: CPT

## 2023-08-22 PROCEDURE — 85045 AUTOMATED RETICULOCYTE COUNT: CPT

## 2023-08-22 PROCEDURE — 83615 LACTATE (LD) (LDH) ENZYME: CPT

## 2023-08-22 PROCEDURE — 85025 COMPLETE CBC W/AUTO DIFF WBC: CPT

## 2023-08-22 PROCEDURE — 83735 ASSAY OF MAGNESIUM: CPT

## 2023-08-22 PROCEDURE — 82746 ASSAY OF FOLIC ACID SERUM: CPT

## 2023-08-22 PROCEDURE — 88305 TISSUE EXAM BY PATHOLOGIST: CPT

## 2023-08-22 PROCEDURE — 99285 EMERGENCY DEPT VISIT HI MDM: CPT | Mod: 25

## 2023-08-22 PROCEDURE — 86922 COMPATIBILITY TEST ANTIGLOB: CPT

## 2023-08-22 PROCEDURE — 82435 ASSAY OF BLOOD CHLORIDE: CPT

## 2023-08-22 PROCEDURE — 99232 SBSQ HOSP IP/OBS MODERATE 35: CPT | Mod: GC

## 2023-08-22 PROCEDURE — 86923 COMPATIBILITY TEST ELECTRIC: CPT

## 2023-08-22 PROCEDURE — 84295 ASSAY OF SERUM SODIUM: CPT

## 2023-08-22 PROCEDURE — 86901 BLOOD TYPING SEROLOGIC RH(D): CPT

## 2023-08-22 PROCEDURE — 85018 HEMOGLOBIN: CPT

## 2023-08-22 PROCEDURE — 91110 GI TRC IMG INTRAL ESOPH-ILE: CPT | Mod: 26,GC

## 2023-08-22 PROCEDURE — 36430 TRANSFUSION BLD/BLD COMPNT: CPT

## 2023-08-22 PROCEDURE — 83540 ASSAY OF IRON: CPT

## 2023-08-22 PROCEDURE — 87040 BLOOD CULTURE FOR BACTERIA: CPT

## 2023-08-22 PROCEDURE — 84132 ASSAY OF SERUM POTASSIUM: CPT

## 2023-08-22 PROCEDURE — 84702 CHORIONIC GONADOTROPIN TEST: CPT

## 2023-08-22 PROCEDURE — 84100 ASSAY OF PHOSPHORUS: CPT

## 2023-08-22 PROCEDURE — 86880 COOMBS TEST DIRECT: CPT

## 2023-08-22 PROCEDURE — 80053 COMPREHEN METABOLIC PANEL: CPT

## 2023-08-22 PROCEDURE — 86850 RBC ANTIBODY SCREEN: CPT

## 2023-08-22 PROCEDURE — 87077 CULTURE AEROBIC IDENTIFY: CPT

## 2023-08-22 PROCEDURE — 96374 THER/PROPH/DIAG INJ IV PUSH: CPT

## 2023-08-22 PROCEDURE — 87150 DNA/RNA AMPLIFIED PROBE: CPT

## 2023-08-22 PROCEDURE — 85610 PROTHROMBIN TIME: CPT

## 2023-08-22 PROCEDURE — 86900 BLOOD TYPING SEROLOGIC ABO: CPT

## 2023-08-22 PROCEDURE — 82607 VITAMIN B-12: CPT

## 2023-08-22 RX ORDER — FERROUS SULFATE 325(65) MG
1 TABLET ORAL
Qty: 30 | Refills: 0
Start: 2023-08-22 | End: 2023-09-20

## 2023-08-22 RX ORDER — SENNA PLUS 8.6 MG/1
2 TABLET ORAL
Qty: 60 | Refills: 0
Start: 2023-08-22 | End: 2023-09-20

## 2023-08-22 RX ORDER — POLYETHYLENE GLYCOL 3350 17 G/17G
17 POWDER, FOR SOLUTION ORAL
Qty: 340 | Refills: 0
Start: 2023-08-22 | End: 2023-08-31

## 2023-08-22 RX ADMIN — Medication 1 TABLET(S): at 17:23

## 2023-08-22 RX ADMIN — Medication 325 MILLIGRAM(S): at 12:19

## 2023-08-22 RX ADMIN — Medication 1 TABLET(S): at 05:52

## 2023-08-22 RX ADMIN — IRON SUCROSE 210 MILLIGRAM(S): 20 INJECTION, SOLUTION INTRAVENOUS at 01:27

## 2023-08-22 NOTE — PROGRESS NOTE ADULT - SUBJECTIVE AND OBJECTIVE BOX
Chief Complaint:  Patient is a 47y old  Female who presents with a chief complaint of Anemia (22 Aug 2023 12:04)      Interval Events:   - Reports feeling well. Denies any N/V/D/C, abd pain, melena or hematochezia.  -s/p EGD (8/21)- normal esophagus; gastritis- biopsied; sleeve gastrectomy was found, characterized by healthy appearing mucosa; normal duodenal bulb and second portion of the duodenum; no endoscopic findings to explain patient's anemia.  -s/p c-scope (8/21)- internal and external hemorrhoids; no gross findings to explain patient's iron deficiency anemia; no specimens collected.  -s/p VCE (8/21)- a single area of angiodysplasia without bleeding in the small bowel of uncertain significance; no obvious findings on capsule endoscopy to explain patient's iron deficiency anemia; capsule endoscope found in the colon at the end of the study.       Hospital Medications:  ferrous    sulfate 325 milliGRAM(s) Oral daily  polyethylene glycol 3350 17 Gram(s) Oral two times a day  senna 2 Tablet(s) Oral at bedtime  trimethoprim  160 mG/sulfamethoxazole 800 mG 1 Tablet(s) Oral two times a day      PMHX/PSHX:          ROS: 14 point ROS negative unless otherwise stated in subjective      PHYSICAL EXAM:     GENERAL:  Well developed, no distress  HEENT:  NC/AT,  conjunctivae clear, sclera anicteric  CHEST:  Full & symmetric excursion, no increased effort w/ respirations  HEART:  Regular rhythm & rate  ABDOMEN:  Soft, non-tender, non-distended  EXTREMITIES:  no LE  edema  SKIN:  No rash/erythema/ecchymoses/petechiae/wounds/jaundice  NEURO:  Alert, orientedx3    Vital Signs:  Vital Signs Last 24 Hrs  T(C): 37.6 (22 Aug 2023 12:23), Max: 37.6 (22 Aug 2023 12:23)  T(F): 99.7 (22 Aug 2023 12:23), Max: 99.7 (22 Aug 2023 12:23)  HR: 78 (22 Aug 2023 12:30) (54 - 78)  BP: 97/65 (22 Aug 2023 12:30) (94/59 - 130/70)  BP(mean): --  RR: 18 (22 Aug 2023 12:30) (16 - 18)  SpO2: 96% (22 Aug 2023 12:30) (96% - 100%)    Parameters below as of 22 Aug 2023 12:30  Patient On (Oxygen Delivery Method): room air      Daily     Daily     LABS:                        7.1    5.82  )-----------( 208      ( 22 Aug 2023 06:38 )             25.4     08-21    142  |  106  |  11  ----------------------------<  80  4.2   |  20<L>  |  0.72    Ca    8.8      21 Aug 2023 07:46  Phos  3.8     08-21  Mg     2.2     08-21        PT/INR - ( 21 Aug 2023 07:46 )   PT: 11.7 sec;   INR: 1.07 ratio         PTT - ( 21 Aug 2023 07:46 )  PTT:26.4 sec  Urinalysis Basic - ( 21 Aug 2023 07:46 )    Color: x / Appearance: x / SG: x / pH: x  Gluc: 80 mg/dL / Ketone: x  / Bili: x / Urobili: x   Blood: x / Protein: x / Nitrite: x   Leuk Esterase: x / RBC: x / WBC x   Sq Epi: x / Non Sq Epi: x / Bacteria: x          Imaging: No new abdominal imaging              Chief Complaint:  Patient is a 47y old  Female who presents with a chief complaint of Anemia (22 Aug 2023 12:04)      Interval Events:   - Reports feeling well. Denies any N/V/D/C, abd pain, melena or hematochezia.  -s/p EGD (8/21)- normal esophagus; gastritis- biopsied; sleeve gastrectomy was found, characterized by healthy appearing mucosa; normal duodenal bulb and second portion of the duodenum; no endoscopic findings to explain patient's anemia.  -s/p c-scope (8/21)- internal and external hemorrhoids; no gross findings to explain patient's iron deficiency anemia; no specimens collected.  -s/p VCE (8/21)- a single red spot without bleeding in the small bowel of uncertain significance; no obvious findings on capsule endoscopy to explain patient's iron deficiency anemia; capsule endoscope found in the colon at the end of the study.       Hospital Medications:  ferrous    sulfate 325 milliGRAM(s) Oral daily  polyethylene glycol 3350 17 Gram(s) Oral two times a day  senna 2 Tablet(s) Oral at bedtime  trimethoprim  160 mG/sulfamethoxazole 800 mG 1 Tablet(s) Oral two times a day      PMHX/PSHX:          ROS: 14 point ROS negative unless otherwise stated in subjective      PHYSICAL EXAM:     GENERAL:  Well developed, no distress  HEENT:  NC/AT,  conjunctivae clear, sclera anicteric  CHEST:  Full & symmetric excursion, no increased effort w/ respirations  HEART:  Regular rhythm & rate  ABDOMEN:  Soft, non-tender, non-distended  EXTREMITIES:  no LE  edema  SKIN:  No rash/erythema/ecchymoses/petechiae/wounds/jaundice  NEURO:  Alert, orientedx3    Vital Signs:  Vital Signs Last 24 Hrs  T(C): 37.6 (22 Aug 2023 12:23), Max: 37.6 (22 Aug 2023 12:23)  T(F): 99.7 (22 Aug 2023 12:23), Max: 99.7 (22 Aug 2023 12:23)  HR: 78 (22 Aug 2023 12:30) (54 - 78)  BP: 97/65 (22 Aug 2023 12:30) (94/59 - 130/70)  BP(mean): --  RR: 18 (22 Aug 2023 12:30) (16 - 18)  SpO2: 96% (22 Aug 2023 12:30) (96% - 100%)    Parameters below as of 22 Aug 2023 12:30  Patient On (Oxygen Delivery Method): room air      Daily     Daily     LABS:                        7.1    5.82  )-----------( 208      ( 22 Aug 2023 06:38 )             25.4     08-21    142  |  106  |  11  ----------------------------<  80  4.2   |  20<L>  |  0.72    Ca    8.8      21 Aug 2023 07:46  Phos  3.8     08-21  Mg     2.2     08-21        PT/INR - ( 21 Aug 2023 07:46 )   PT: 11.7 sec;   INR: 1.07 ratio         PTT - ( 21 Aug 2023 07:46 )  PTT:26.4 sec  Urinalysis Basic - ( 21 Aug 2023 07:46 )    Color: x / Appearance: x / SG: x / pH: x  Gluc: 80 mg/dL / Ketone: x  / Bili: x / Urobili: x   Blood: x / Protein: x / Nitrite: x   Leuk Esterase: x / RBC: x / WBC x   Sq Epi: x / Non Sq Epi: x / Bacteria: x          Imaging: No new abdominal imaging

## 2023-08-22 NOTE — DISCHARGE NOTE PROVIDER - NSDCCPCAREPLAN_GEN_ALL_CORE_FT
PRINCIPAL DISCHARGE DIAGNOSIS  Diagnosis: Symptomatic anemia  Assessment and Plan of Treatment:       SECONDARY DISCHARGE DIAGNOSES  Diagnosis: Acute folliculitis  Assessment and Plan of Treatment:      PRINCIPAL DISCHARGE DIAGNOSIS  Diagnosis: Symptomatic anemia  Assessment and Plan of Treatment: Sent by PMd for acute on chronic anemia  Unknown BL Hgb. Chronic anemia since age 14. Patient presents with significant anemia in the absence of overt GI bleeding.  Differential diagnosis remains broad and includes thalassemia vs menorrhagia vs chronic anemia 2/2 gastric sleeve.  -s/p EGD (8/21)- normal esophagus; gastritis- biopsied; sleeve gastrectomy was found, characterized by healthy appearing mucosa; normal duodenal bulb and second portion of the duodenum; no endoscopic findings to explain patient's anemia.  -s/p c-scope (8/21)- internal and external hemorrhoids; no gross findings to explain patient's iron deficiency anemia; no specimens collected.  -s/p VCE (8/21)- a single area of angiodysplasia without bleeding in the small bowel of uncertain significance; no obvious findings on capsule endoscopy to explain patient's iron deficiency anemia; capsule endoscope found in the colon at the end of the study.   -f/u path results from EGD (8/21)  -avoid NSAIDs (eg: Motrin, Aleive)        SECONDARY DISCHARGE DIAGNOSES  Diagnosis: Acute folliculitis  Assessment and Plan of Treatment:      PRINCIPAL DISCHARGE DIAGNOSIS  Diagnosis: Symptomatic anemia  Assessment and Plan of Treatment: Sent by PMd for acute on chronic anemia  Yor hemoglobin was 4.6, received 2 units of blood transfusions. Now hb 7.1  Unknown BL Hgb. Chronic anemia since age 14. Patient presents with significant anemia in the absence of overt GI bleeding.  Differential diagnosis remains broad and includes thalassemia vs menorrhagia vs chronic anemia 2/2 gastric sleeve.  -s/p EGD (8/21)- normal esophagus; gastritis- biopsied; sleeve gastrectomy was found, characterized by healthy appearing mucosa; normal duodenal bulb and second portion of the duodenum; no endoscopic findings to explain patient's anemia.  -s/p c-scope (8/21)- internal and external hemorrhoids; no gross findings to explain patient's iron deficiency anemia; no specimens collected.  -s/p VCE (8/21)- a single area of angiodysplasia without bleeding in the small bowel of uncertain significance; no obvious findings on capsule endoscopy to explain patient's iron deficiency anemia; capsule endoscope found in the colon at the end of the study.   -f/u path results from EGD (8/21)  -avoid NSAIDs (eg: Motrin, Aleive)  Hematology was consulted, noted severe iron defeciency anemia, recived iron transfusions. Follow up as out patient        SECONDARY DISCHARGE DIAGNOSES  Diagnosis: Acute folliculitis  Assessment and Plan of Treatment:   # Vulvar folliculitis  GYN consulted for likely folliculitis. Patient additionally afebrile w/o any leukocytosis.   Patient reports shaves vilva Q 3 days  Blood cx with gram positive bacteria, likely contaminated.   Recommendation is as follows:  - Sitz baths TID for w/ each session ~20min  - Continue bactrim x 7 days total   - Discontinue shaving of vulva   Follow up with your GYN doctor.

## 2023-08-22 NOTE — DISCHARGE NOTE PROVIDER - PROVIDER TOKENS
PROVIDER:[TOKEN:[[id number]]],FREE:[LAST:[chalil],FIRST:[aydee],PHONE:[(   )    -],FAX:[(   )    -],ADDRESS:[[phone]]] PROVIDER:[TOKEN:[[id number]]],FREE:[LAST:[chalil],FIRST:[aydee],PHONE:[(   )    -],FAX:[(   )    -],ADDRESS:[[phone]]] PROVIDER:[TOKEN:[[id number]]],FREE:[LAST:[chalil],FIRST:[aydee],PHONE:[(   )    -],FAX:[(   )    -],ADDRESS:[[phone]]] PROVIDER:[TOKEN:[[id number]]],FREE:[LAST:[chalil],FIRST:[aydee],PHONE:[(   )    -],FAX:[(   )    -],ADDRESS:[[phone]]],PROVIDER:[TOKEN:[[id number]]] PROVIDER:[TOKEN:[[id number]]],FREE:[LAST:[chalil],FIRST:[aydee],PHONE:[(   )    -],FAX:[(   )    -],ADDRESS:[[phone]]],PROVIDER:[TOKEN:[[id number]]] PROVIDER:[TOKEN:[[id number]]],FREE:[LAST:[chalil],FIRST:[aydee],PHONE:[(   )    -],FAX:[(   )    -],ADDRESS:[[phone]]],PROVIDER:[TOKEN:[[id number]]] PROVIDER:[TOKEN:[[id number]],FOLLOWUP:[1 week]],PROVIDER:[TOKEN:[[id number]],FOLLOWUP:[1 week]],FREE:[LAST:[chalil],FIRST:[aydee],PHONE:[(   )    -],FAX:[(   )    -],ADDRESS:[[phone]],FOLLOWUP:[1 week],ESTABLISHEDPATIENT:[T]] PROVIDER:[TOKEN:[[id number]],FOLLOWUP:[1 week]],PROVIDER:[TOKEN:[[id number]],FOLLOWUP:[1 week]],FREE:[LAST:[chalil],FIRST:[aydee],PHONE:[(   )    -],FAX:[(   )    -],ADDRESS:[[phone]],FOLLOWUP:[1 week],ESTABLISHEDPATIENT:[T]] PROVIDER:[TOKEN:[[id number]],FOLLOWUP:[1 week]],PROVIDER:[TOKEN:[[id number]],FOLLOWUP:[1 week]],FREE:[LAST:[chalil],FIRST:[aydee],PHONE:[(   )    -],FAX:[(   )    -],ADDRESS:[[phone]],FOLLOWUP:[1 week],ESTABLISHEDPATIENT:[T]]

## 2023-08-22 NOTE — DISCHARGE NOTE NURSING/CASE MANAGEMENT/SOCIAL WORK - NSDCPNINST_GEN_ALL_CORE
call for follow up appointment with primary care  md   follow up  with  Gasto  md   and  GYN     diet  medications activity  as per md   any severe pain nausea vomiting bleeding   dizzyness  shortness breath  any  problems call md call 911 [facility]  EMERGENCY ROOM  call for follow up appointment with primary care  md   follow up  with  Gasto  md   and  GYN     diet  medications activity  as per md   any severe pain nausea vomiting bleeding   dizzyness  shortness breath  any  problems call md call 911 [facility]  EMERGENCY ROOM  call for follow up appointment with primary care  md   follow up  with  Gasto  md   and  GYN     diet  medications activity  as per md   any severe pain nausea vomiting bleeding   dizzyness  shortness breath  any  problems call md call 911 [facility]  EMERGENCY ROOM

## 2023-08-22 NOTE — DISCHARGE NOTE PROVIDER - NSDCHC_MEDRECSTATUS_GEN_ALL_CORE
Admission Reconciliation is Not Complete  Discharge Reconciliation is Not Complete [facility] Admission Reconciliation is Not Complete  Discharge Reconciliation is Completed

## 2023-08-22 NOTE — PROGRESS NOTE ADULT - ASSESSMENT
47F with chronic anemia, constipation p/w worsened anemia    #Anemia, microcytic  - severe iron deficiency despite regular oral iron use; hx of gastric sleeve  - denies heavy MP; EGD/colonoscopy are pending  - GI is following  - s/p 2 unit PRBC  - will dose IV iron x 1, blood cx is contaminant per medicine  - will need outpt FU for further IV iron  - folate is ok  - B12 is also low normal- will dose IM B12- will need further doses as outpatient Patient with PMH chronic anemia (dx at age 14; on iron supplements), chronic intermittent constipation, gastric sleeve p/w  worsening of chronic anemia. Reported 1 month of fatigue, drowsiness, shortness of breath; Needed PRBC post admission  Complete GI eval done as mentioned in note  Apparently menstrual cycle is on heavy side  Needs to continue with iron supplement and can continue with IV iron as outpatient  B12 was low normal and was given one dose to continue as outpatient

## 2023-08-22 NOTE — DISCHARGE NOTE NURSING/CASE MANAGEMENT/SOCIAL WORK - PATIENT PORTAL LINK FT
You can access the FollowMyHealth Patient Portal offered by [facility] by registering at the following website: [email or web address]. By joining [employer]’s FollowMyHealth portal, you will also be able to view your health information using other applications (apps) compatible with our system. You can access the FollowMyHealth Patient Portal offered by [facility] by registering at the following website: [email or web address]. By joining [employer]’s FollowMyHealth portal, you will also be able to view your health information using other applications (apps) compatible with our system. You can access the FollowMyHealth Patient Portal offered by [facility] by registering at the following website: [email or web address]. By joining [employer]’s FollowMyHealth portal, you will also be able to view your health information using other applications (apps) compatible with our system.

## 2023-08-22 NOTE — DISCHARGE NOTE PROVIDER - HOSPITAL COURSE
46yo F who presents to the ED after being referred to the ED by her PMD for anemia of unclear etiology    Dx acute on chronic Anemia s/p 2U prbc hgb 4.6 > 5.9 >         GI consulted - f/u recs         Folliculitis of Vulva - GYN consulted, Dicloxacillin x 7 day, not formulary.>Bactrim x 7 days        + Blood cx prelim - await final cultures, likely contaminant       s/p EGD / colonoscopy :negative, video capsule placed   48yo F who presents to the ED after being referred to the ED by her PMD for anemia of unclear etiology    Dx acute on chronic Anemia s/p 2U prbc hgb 4.6 > 5.9 >         GI consulted - f/u recs         Folliculitis of Vulva - GYN consulted, Dicloxacillin x 7 day, not formulary.>Bactrim x 7 days        + Blood cx prelim - await final cultures, likely contaminant       s/p EGD / colonoscopy :negative, video capsule placed   46yo Icelandic speaking F with PMH chronic anemia (dx at age 14; on iron supplements), chronic intermittent constipation, gastric sleeve (done 2020; with 100 lb weight loss) who presents to the ED after being referred to the ED for acute worsening of chronic anemia. Reports 1 month of fatigue, drowsiness, shortness of breath; had OP blood work done by PCP and was told to come to the ED for further eval. Report intermittent vomiting 2/2 known food triggers such as fruits, watermelon, fatty/greasy foods; last vomited yesterday after eating watermelon. Has also been reporting a vulvar lesion of x1 week duration. Reports history of heavy vaginal bleeding with menses- changes pads 5-7x/day during first few days of menses which she states is bloody with dark clots. Reports chronic nightly NSAID use (advil) few times per week that she takes to help her sleep when she has migraines lasting several days. Reports epigastric abd pain x 1 month. GI consulted for anemia w/o overt GIB.    #acute on chronic anemia  #microcytic anemia  Unknown base line Hgb. Chronic anemia since age 14. Patient presents with significant anemia in the absence of overt GI bleeding.  Differential diagnosis remains broad and includes thalassemia vs menorrhagia vs chronic anemia 2/2 gastric sleeve.  -s/p EGD (8/21)- normal esophagus; gastritis- biopsied; sleeve gastrectomy was found, characterized by healthy appearing mucosa; normal duodenal bulb and second portion of the duodenum; no endoscopic findings to explain patient's anemia.  -s/p c-scope (8/21)- internal and external hemorrhoids; no gross findings to explain patient's iron deficiency anemia; no specimens collected.  -s/p VCE (8/21)- a single area of angiodysplasia without bleeding in the small bowel of uncertain significance; no obvious findings on capsule endoscopy to explain patient's iron deficiency anemia; capsule endoscope found in the colon at the end of the study.     #internal and external hemorrhoids  #chronic intermittent constipation          **  Dx acute on chronic Anemia s/p 2U prbc hgb 4.6 > 5.9 >         GI consulted - f/u recs         Folliculitis of Vulva - GYN consulted, Dicloxacillin x 7 day, not formulary.>Bactrim x 7 days        + Blood cx prelim - await final cultures, likely contaminant       s/p EGD / colonoscopy :negative, video capsule placed   46yo Vietnamese speaking F with PMH chronic anemia (dx at age 14; on iron supplements), chronic intermittent constipation, gastric sleeve (done 2020; with 100 lb weight loss) who presents to the ED after being referred to the ED for acute worsening of chronic anemia. Reports 1 month of fatigue, drowsiness, shortness of breath; had OP blood work done by PCP and was told to come to the ED for further eval. Report intermittent vomiting 2/2 known food triggers such as fruits, watermelon, fatty/greasy foods; last vomited yesterday after eating watermelon. Has also been reporting a vulvar lesion of x1 week duration. Reports history of heavy vaginal bleeding with menses- changes pads 5-7x/day during first few days of menses which she states is bloody with dark clots. Reports chronic nightly NSAID use (advil) few times per week that she takes to help her sleep when she has migraines lasting several days. Reports epigastric abd pain x 1 month. GI consulted for anemia w/o overt GIB.    #acute on chronic anemia  #microcytic anemia  Unknown base line Hgb. Chronic anemia since age 14. Patient presents with significant anemia in the absence of overt GI bleeding.  Differential diagnosis remains broad and includes thalassemia vs menorrhagia vs chronic anemia 2/2 gastric sleeve.  -s/p EGD (8/21)- normal esophagus; gastritis- biopsied; sleeve gastrectomy was found, characterized by healthy appearing mucosa; normal duodenal bulb and second portion of the duodenum; no endoscopic findings to explain patient's anemia.  -s/p c-scope (8/21)- internal and external hemorrhoids; no gross findings to explain patient's iron deficiency anemia; no specimens collected.  -s/p VCE (8/21)- a single area of angiodysplasia without bleeding in the small bowel of uncertain significance; no obvious findings on capsule endoscopy to explain patient's iron deficiency anemia; capsule endoscope found in the colon at the end of the study.     #internal and external hemorrhoids  #chronic intermittent constipation          **  Dx acute on chronic Anemia s/p 2U prbc hgb 4.6 > 5.9 >         GI consulted - f/u recs         Folliculitis of Vulva - GYN consulted, Dicloxacillin x 7 day, not formulary.>Bactrim x 7 days        + Blood cx prelim - await final cultures, likely contaminant       s/p EGD / colonoscopy :negative, video capsule placed   48yo Amharic speaking F with PMH chronic anemia (dx at age 14; on iron supplements), chronic intermittent constipation, gastric sleeve (done 2020; with 100 lb weight loss) who presents to the ED after being referred to the ED for acute worsening of chronic anemia. Reports 1 month of fatigue, drowsiness, shortness of breath; had OP blood work done by PCP and was told to come to the ED for further eval. Report intermittent vomiting 2/2 known food triggers such as fruits, watermelon, fatty/greasy foods; last vomited yesterday after eating watermelon. Has also been reporting a vulvar lesion of x1 week duration. Reports history of heavy vaginal bleeding with menses- changes pads 5-7x/day during first few days of menses which she states is bloody with dark clots. Reports chronic nightly NSAID use (advil) few times per week that she takes to help her sleep when she has migraines lasting several days. Reports epigastric abd pain x 1 month. GI consulted for anemia w/o overt GIB.    #acute on chronic anemia  #microcytic anemia  Unknown base line Hgb. Chronic anemia since age 14. Patient presents with significant anemia in the absence of overt GI bleeding.  Differential diagnosis remains broad and includes thalassemia vs menorrhagia vs chronic anemia 2/2 gastric sleeve.  -s/p EGD (8/21)- normal esophagus; gastritis- biopsied; sleeve gastrectomy was found, characterized by healthy appearing mucosa; normal duodenal bulb and second portion of the duodenum; no endoscopic findings to explain patient's anemia.  -s/p c-scope (8/21)- internal and external hemorrhoids; no gross findings to explain patient's iron deficiency anemia; no specimens collected.  -s/p VCE (8/21)- a single area of angiodysplasia without bleeding in the small bowel of uncertain significance; no obvious findings on capsule endoscopy to explain patient's iron deficiency anemia; capsule endoscope found in the colon at the end of the study.     #internal and external hemorrhoids  #chronic intermittent constipation          **  Dx acute on chronic Anemia s/p 2U prbc hgb 4.6 > 5.9 >         GI consulted - f/u recs         Folliculitis of Vulva - GYN consulted, Dicloxacillin x 7 day, not formulary.>Bactrim x 7 days        + Blood cx prelim - await final cultures, likely contaminant       s/p EGD / colonoscopy :negative, video capsule placed   48yo Greek speaking F with PMH chronic anemia (dx at age 14; on iron supplements), chronic intermittent constipation, gastric sleeve (done 2020; with 100 lb weight loss) who presents to the ED after being referred to the ED for acute worsening of chronic anemia. Reports 1 month of fatigue, drowsiness, shortness of breath; had OP blood work done by PCP and was told to come to the ED for further eval. Report intermittent vomiting 2/2 known food triggers such as fruits, watermelon, fatty/greasy foods; last vomited yesterday after eating watermelon. Has also been reporting a vulvar lesion of x1 week duration. Reports history of heavy vaginal bleeding with menses- changes pads 5-7x/day during first few days of menses which she states is bloody with dark clots. Reports chronic nightly NSAID use (advil) few times per week that she takes to help her sleep when she has migraines lasting several days. Reports epigastric abd pain x 1 month. GI consulted for anemia w/o overt GIB.    #acute on chronic anemia  #microcytic anemia  Admitted with hemoglobin of 4.6, s/p 2 prbcs, now 7.1.  Unknown base line Hgb. Chronic anemia since age 14. Patient presents with significant anemia in the absence of overt GI bleeding.  Differential diagnosis remains broad and includes thalassemia vs menorrhagia vs chronic anemia 2/2 gastric sleeve.  -s/p EGD (8/21)- normal esophagus; gastritis- biopsied; sleeve gastrectomy was found, characterized by healthy appearing mucosa; normal duodenal bulb and second portion of the duodenum; no endoscopic findings to explain patient's anemia.  -s/p c-scope (8/21)- internal and external hemorrhoids; no gross findings to explain patient's iron deficiency anemia; no specimens collected.  -s/p VCE (8/21)- a single area of angiodysplasia without bleeding in the small bowel of uncertain significance; no obvious findings on capsule endoscopy to explain patient's iron deficiency anemia; capsule endoscope found in the colon at the end of the study.   Hem consulted, noted severe HERMAN-s/p iron infusion, needs to f/u out patient    # Vulvar folliculitis  GYN consulted for likely folliculitis. Patient additionally afebrile w/o any leukocytosis.   Patient reports shaves vilva Q 3 days  Blood cx with gram positive bacteria, likely contaminated.     Recommendation is as follows:  - Sitz baths TID for w/ each session ~20min  - Continue bactrim x 7 days total   - Discontinue shaving of vulva   Cleared for discharge with out patient follow up   48yo Turkmen speaking F with PMH chronic anemia (dx at age 14; on iron supplements), chronic intermittent constipation, gastric sleeve (done 2020; with 100 lb weight loss) who presents to the ED after being referred to the ED for acute worsening of chronic anemia. Reports 1 month of fatigue, drowsiness, shortness of breath; had OP blood work done by PCP and was told to come to the ED for further eval. Report intermittent vomiting 2/2 known food triggers such as fruits, watermelon, fatty/greasy foods; last vomited yesterday after eating watermelon. Has also been reporting a vulvar lesion of x1 week duration. Reports history of heavy vaginal bleeding with menses- changes pads 5-7x/day during first few days of menses which she states is bloody with dark clots. Reports chronic nightly NSAID use (advil) few times per week that she takes to help her sleep when she has migraines lasting several days. Reports epigastric abd pain x 1 month. GI consulted for anemia w/o overt GIB.    #acute on chronic anemia  #microcytic anemia  Admitted with hemoglobin of 4.6, s/p 2 prbcs, now 7.1.  Unknown base line Hgb. Chronic anemia since age 14. Patient presents with significant anemia in the absence of overt GI bleeding.  Differential diagnosis remains broad and includes thalassemia vs menorrhagia vs chronic anemia 2/2 gastric sleeve.  -s/p EGD (8/21)- normal esophagus; gastritis- biopsied; sleeve gastrectomy was found, characterized by healthy appearing mucosa; normal duodenal bulb and second portion of the duodenum; no endoscopic findings to explain patient's anemia.  -s/p c-scope (8/21)- internal and external hemorrhoids; no gross findings to explain patient's iron deficiency anemia; no specimens collected.  -s/p VCE (8/21)- a single area of angiodysplasia without bleeding in the small bowel of uncertain significance; no obvious findings on capsule endoscopy to explain patient's iron deficiency anemia; capsule endoscope found in the colon at the end of the study.   Hem consulted, noted severe HERMAN-s/p iron infusion, needs to f/u out patient    # Vulvar folliculitis  GYN consulted for likely folliculitis. Patient additionally afebrile w/o any leukocytosis.   Patient reports shaves vilva Q 3 days  Blood cx with gram positive bacteria, likely contaminated.     Recommendation is as follows:  - Sitz baths TID for w/ each session ~20min  - Continue bactrim x 7 days total   - Discontinue shaving of vulva   Cleared for discharge with out patient follow up   46yo Faroese speaking F with PMH chronic anemia (dx at age 14; on iron supplements), chronic intermittent constipation, gastric sleeve (done 2020; with 100 lb weight loss) who presents to the ED after being referred to the ED for acute worsening of chronic anemia. Reports 1 month of fatigue, drowsiness, shortness of breath; had OP blood work done by PCP and was told to come to the ED for further eval. Report intermittent vomiting 2/2 known food triggers such as fruits, watermelon, fatty/greasy foods; last vomited yesterday after eating watermelon. Has also been reporting a vulvar lesion of x1 week duration. Reports history of heavy vaginal bleeding with menses- changes pads 5-7x/day during first few days of menses which she states is bloody with dark clots. Reports chronic nightly NSAID use (advil) few times per week that she takes to help her sleep when she has migraines lasting several days. Reports epigastric abd pain x 1 month. GI consulted for anemia w/o overt GIB.    #acute on chronic anemia  #microcytic anemia  Admitted with hemoglobin of 4.6, s/p 2 prbcs, now 7.1.  Unknown base line Hgb. Chronic anemia since age 14. Patient presents with significant anemia in the absence of overt GI bleeding.  Differential diagnosis remains broad and includes thalassemia vs menorrhagia vs chronic anemia 2/2 gastric sleeve.  -s/p EGD (8/21)- normal esophagus; gastritis- biopsied; sleeve gastrectomy was found, characterized by healthy appearing mucosa; normal duodenal bulb and second portion of the duodenum; no endoscopic findings to explain patient's anemia.  -s/p c-scope (8/21)- internal and external hemorrhoids; no gross findings to explain patient's iron deficiency anemia; no specimens collected.  -s/p VCE (8/21)- a single area of angiodysplasia without bleeding in the small bowel of uncertain significance; no obvious findings on capsule endoscopy to explain patient's iron deficiency anemia; capsule endoscope found in the colon at the end of the study.   Hem consulted, noted severe HERMAN-s/p iron infusion, needs to f/u out patient    # Vulvar folliculitis  GYN consulted for likely folliculitis. Patient additionally afebrile w/o any leukocytosis.   Patient reports shaves vilva Q 3 days  Blood cx with gram positive bacteria, likely contaminated.     Recommendation is as follows:  - Sitz baths TID for w/ each session ~20min  - Continue bactrim x 7 days total   - Discontinue shaving of vulva   Cleared for discharge with out patient follow up   48yo Portuguese speaking F with PMH chronic anemia (dx at age 14; on iron supplements), chronic intermittent constipation, gastric sleeve (done 2020; with 100 lb weight loss) who presents to the ED after being referred to the ED for acute worsening of chronic anemia. Reports 1 month of fatigue, drowsiness, shortness of breath; had OP blood work done by PCP and was told to come to the ED for further eval. Report intermittent vomiting 2/2 known food triggers such as fruits, watermelon, fatty/greasy foods; last vomited yesterday after eating watermelon. Has also been reporting a vulvar lesion of x1 week duration. Reports history of heavy vaginal bleeding with menses- changes pads 5-7x/day during first few days of menses which she states is bloody with dark clots. Reports chronic nightly NSAID use (advil) few times per week that she takes to help her sleep when she has migraines lasting several days. Reports epigastric abd pain x 1 month. GI consulted for anemia w/o overt GIB.    # Acute on chronic anemia  #microcytic anemia  Admitted with hemoglobin of 4.6, s/p 2 prbcs, now 7.1.  Unknown base line Hgb. Chronic anemia since age 14. Patient presents with significant anemia in the absence of overt GI bleeding.  Differential diagnosis remains broad and includes thalassemia vs menorrhagia vs chronic anemia 2/2 gastric sleeve.  -s/p EGD (8/21)- normal esophagus; gastritis- biopsied; sleeve gastrectomy was found, characterized by healthy appearing mucosa; normal duodenal bulb and second portion of the duodenum; no endoscopic findings to explain patient's anemia.  -s/p c-scope (8/21)- internal and external hemorrhoids; no gross findings to explain patient's iron deficiency anemia; no specimens collected.  -s/p VCE (8/21)- a single area of angiodysplasia without bleeding in the small bowel of uncertain significance; no obvious findings on capsule endoscopy to explain patient's iron deficiency anemia; capsule endoscope found in the colon at the end of the study.   Hem consulted, noted severe HERMAN-s/p iron infusion, needs to f/u out patient    # Vulvar folliculitis  GYN consulted for likely folliculitis. Patient additionally afebrile w/o any leukocytosis.   Patient reports shaves vulva Q 3 days  Blood cx with gram positive bacteria, likely contaminated.     Recommendation is as follows:  - Sitz baths TID for w/ each session ~20min  - Continue bactrim x 7 days total   - Discontinue shaving of vulva   Cleared for discharge with out patient follow up.   46yo Irish speaking F with PMH chronic anemia (dx at age 14; on iron supplements), chronic intermittent constipation, gastric sleeve (done 2020; with 100 lb weight loss) who presents to the ED after being referred to the ED for acute worsening of chronic anemia. Reports 1 month of fatigue, drowsiness, shortness of breath; had OP blood work done by PCP and was told to come to the ED for further eval. Report intermittent vomiting 2/2 known food triggers such as fruits, watermelon, fatty/greasy foods; last vomited yesterday after eating watermelon. Has also been reporting a vulvar lesion of x1 week duration. Reports history of heavy vaginal bleeding with menses- changes pads 5-7x/day during first few days of menses which she states is bloody with dark clots. Reports chronic nightly NSAID use (advil) few times per week that she takes to help her sleep when she has migraines lasting several days. Reports epigastric abd pain x 1 month. GI consulted for anemia w/o overt GIB.    # Acute on chronic anemia  #microcytic anemia  Admitted with hemoglobin of 4.6, s/p 2 prbcs, now 7.1.  Unknown base line Hgb. Chronic anemia since age 14. Patient presents with significant anemia in the absence of overt GI bleeding.  Differential diagnosis remains broad and includes thalassemia vs menorrhagia vs chronic anemia 2/2 gastric sleeve.  -s/p EGD (8/21)- normal esophagus; gastritis- biopsied; sleeve gastrectomy was found, characterized by healthy appearing mucosa; normal duodenal bulb and second portion of the duodenum; no endoscopic findings to explain patient's anemia.  -s/p c-scope (8/21)- internal and external hemorrhoids; no gross findings to explain patient's iron deficiency anemia; no specimens collected.  -s/p VCE (8/21)- a single area of angiodysplasia without bleeding in the small bowel of uncertain significance; no obvious findings on capsule endoscopy to explain patient's iron deficiency anemia; capsule endoscope found in the colon at the end of the study.   Hem consulted, noted severe HERMAN-s/p iron infusion, needs to f/u out patient    # Vulvar folliculitis  GYN consulted for likely folliculitis. Patient additionally afebrile w/o any leukocytosis.   Patient reports shaves vulva Q 3 days  Blood cx with gram positive bacteria, likely contaminated.     Recommendation is as follows:  - Sitz baths TID for w/ each session ~20min  - Continue bactrim x 7 days total   - Discontinue shaving of vulva   Cleared for discharge with out patient follow up.   48yo Pashto speaking F with PMH chronic anemia (dx at age 14; on iron supplements), chronic intermittent constipation, gastric sleeve (done 2020; with 100 lb weight loss) who presents to the ED after being referred to the ED for acute worsening of chronic anemia. Reports 1 month of fatigue, drowsiness, shortness of breath; had OP blood work done by PCP and was told to come to the ED for further eval. Report intermittent vomiting 2/2 known food triggers such as fruits, watermelon, fatty/greasy foods; last vomited yesterday after eating watermelon. Has also been reporting a vulvar lesion of x1 week duration. Reports history of heavy vaginal bleeding with menses- changes pads 5-7x/day during first few days of menses which she states is bloody with dark clots. Reports chronic nightly NSAID use (advil) few times per week that she takes to help her sleep when she has migraines lasting several days. Reports epigastric abd pain x 1 month. GI consulted for anemia w/o overt GIB.    # Acute on chronic anemia  #microcytic anemia  Admitted with hemoglobin of 4.6, s/p 2 prbcs, now 7.1.  Unknown base line Hgb. Chronic anemia since age 14. Patient presents with significant anemia in the absence of overt GI bleeding.  Differential diagnosis remains broad and includes thalassemia vs menorrhagia vs chronic anemia 2/2 gastric sleeve.  -s/p EGD (8/21)- normal esophagus; gastritis- biopsied; sleeve gastrectomy was found, characterized by healthy appearing mucosa; normal duodenal bulb and second portion of the duodenum; no endoscopic findings to explain patient's anemia.  -s/p c-scope (8/21)- internal and external hemorrhoids; no gross findings to explain patient's iron deficiency anemia; no specimens collected.  -s/p VCE (8/21)- a single area of angiodysplasia without bleeding in the small bowel of uncertain significance; no obvious findings on capsule endoscopy to explain patient's iron deficiency anemia; capsule endoscope found in the colon at the end of the study.   Hem consulted, noted severe HERMAN-s/p iron infusion, needs to f/u out patient    # Vulvar folliculitis  GYN consulted for likely folliculitis. Patient additionally afebrile w/o any leukocytosis.   Patient reports shaves vulva Q 3 days  Blood cx with gram positive bacteria, likely contaminated.     Recommendation is as follows:  - Sitz baths TID for w/ each session ~20min  - Continue bactrim x 7 days total   - Discontinue shaving of vulva   Cleared for discharge with out patient follow up.

## 2023-08-22 NOTE — PROGRESS NOTE ADULT - ASSESSMENT
48yo English speaking F with PMH chronic anemia (dx at age 14; on iron supplements), chronic intermittent constipation, gastric sleeve (done 2020; with 100 lb weight loss) who presents to the ED after being referred to the ED for acute worsening of chronic anemia. Reports 1 month of fatigue, drowsiness, shortness of breath; had OP blood work done by PCP and was told to come to the ED for further eval. Report intermittent vomiting 2/2 known food triggers such as fruits, watermelon, fatty/greasy foods; last vomited yesterday after eating watermelon. Has also been reporting a vulvar lesion of x1 week duration. Reports history of heavy vaginal bleeding with menses- changes pads 5-7x/day during first few days of menses which she states is bloody with dark clots. Reports chronic nightly NSAID use (advil) few times per week that she takes to help her sleep when she has migraines lasting several days. Reports epigastric abd pain x 1 month. GI consulted for anemia w/o overt GIB.    #acute on chronic anemia  #microcytic anemia  #h/o gastric sleeve (2020)  Unknown BL Hgb. Chronic anemia since age 14. Patient presents with significant anemia in the absence of overt GI bleeding.  Differential diagnosis remains broad and includes thalassemia vs menorrhagia vs chronic anemia 2/2 gastric sleeve.  -s/p EGD (8/21)- normal esophagus; gastritis- biopsied; sleeve gastrectomy was found, characterized by healthy appearing mucosa; normal duodenal bulb and second portion of the duodenum; no endoscopic findings to explain patient's anemia.  -s/p c-scope (8/21)- internal and external hemorrhoids; no gross findings to explain patient's iron deficiency anemia; no specimens collected.  -s/p VCE (8/21)- a single area of angiodysplasia without bleeding in the small bowel of uncertain significance; no obvious findings on capsule endoscopy to explain patient's iron deficiency anemia; capsule endoscope found in the colon at the end of the study.     #internal and external hemorrhoids  #chronic intermittent constipation    Recommendations:  -trend vitals, CBC, and monitor for clinical signs of bleeding  -maintain active type and screen  -transfusion goal to maintain hemoglobin >/= 7.0  -appreciate GYN input re: menorrhagia  -appreciate hematology input into eval for other causes of microcytic anemia  -consider IV iron infusion  -f/u path results from EGD (8/21)  -avoid NSAIDs    OK to discharge from GI standpoint. We will sign off at this time. Please reconsult/page if questions.      **THIS NOTE IS NOT FINALIZED UNTIL SIGNED BY THE ATTENDING**    Sherry Ledezma MD  GI Fellow, PGY-6  Available via Microsoft Teams    NON-URGENT CONSULTS:  Please email [email or web address]  AT NIGHT AND ON WEEKENDS:  Contact on-call GI fellow via answering service ([phone]) from 5pm-8am and on weekends/holidays  MONDAY-FRIDAY 8AM-5PM:  Pager# [phone] (BENNY) or [phone] ([facility])   48yo Croatian speaking F with PMH chronic anemia (dx at age 14; on iron supplements), chronic intermittent constipation, gastric sleeve (done 2020; with 100 lb weight loss) who presents to the ED after being referred to the ED for acute worsening of chronic anemia. Reports 1 month of fatigue, drowsiness, shortness of breath; had OP blood work done by PCP and was told to come to the ED for further eval. Report intermittent vomiting 2/2 known food triggers such as fruits, watermelon, fatty/greasy foods; last vomited yesterday after eating watermelon. Has also been reporting a vulvar lesion of x1 week duration. Reports history of heavy vaginal bleeding with menses- changes pads 5-7x/day during first few days of menses which she states is bloody with dark clots. Reports chronic nightly NSAID use (advil) few times per week that she takes to help her sleep when she has migraines lasting several days. Reports epigastric abd pain x 1 month. GI consulted for anemia w/o overt GIB.    #acute on chronic anemia  #microcytic anemia  #h/o gastric sleeve (2020)  Unknown BL Hgb. Chronic anemia since age 14. Patient presents with significant anemia in the absence of overt GI bleeding.  Differential diagnosis remains broad and includes thalassemia vs menorrhagia vs chronic anemia 2/2 gastric sleeve.  -s/p EGD (8/21)- normal esophagus; gastritis- biopsied; sleeve gastrectomy was found, characterized by healthy appearing mucosa; normal duodenal bulb and second portion of the duodenum; no endoscopic findings to explain patient's anemia.  -s/p c-scope (8/21)- internal and external hemorrhoids; no gross findings to explain patient's iron deficiency anemia; no specimens collected.  -s/p VCE (8/21)- a single area of angiodysplasia without bleeding in the small bowel of uncertain significance; no obvious findings on capsule endoscopy to explain patient's iron deficiency anemia; capsule endoscope found in the colon at the end of the study.     #internal and external hemorrhoids  #chronic intermittent constipation    Recommendations:  -trend vitals, CBC, and monitor for clinical signs of bleeding  -maintain active type and screen  -transfusion goal to maintain hemoglobin >/= 7.0  -appreciate GYN input re: menorrhagia  -appreciate hematology input into eval for other causes of microcytic anemia  -consider IV iron infusion  -f/u path results from EGD (8/21)  -avoid NSAIDs    OK to discharge from GI standpoint. We will sign off at this time. Please reconsult/page if questions.      **THIS NOTE IS NOT FINALIZED UNTIL SIGNED BY THE ATTENDING**    Sherry Ledezma MD  GI Fellow, PGY-6  Available via Microsoft Teams    NON-URGENT CONSULTS:  Please email [email or web address]  [email or web address]  AT NIGHT AND ON WEEKENDS:  Contact on-call GI fellow via answering service ([phone]) from 5pm-8am and on weekends/holidays  MONDAY-FRIDAY 8AM-5PM:  Pager# [phone] (BENNY) or [phone] ([facility])   48yo Slovenian speaking F with PMH chronic anemia (dx at age 14; on iron supplements), chronic intermittent constipation, gastric sleeve (done 2020; with 100 lb weight loss) who presents to the ED after being referred to the ED for acute worsening of chronic anemia. Reports 1 month of fatigue, drowsiness, shortness of breath; had OP blood work done by PCP and was told to come to the ED for further eval. Report intermittent vomiting 2/2 known food triggers such as fruits, watermelon, fatty/greasy foods; last vomited yesterday after eating watermelon. Has also been reporting a vulvar lesion of x1 week duration. Reports history of heavy vaginal bleeding with menses- changes pads 5-7x/day during first few days of menses which she states is bloody with dark clots. Reports chronic nightly NSAID use (advil) few times per week that she takes to help her sleep when she has migraines lasting several days. Reports epigastric abd pain x 1 month. GI consulted for anemia w/o overt GIB.    #acute on chronic anemia  #microcytic anemia  #h/o gastric sleeve (2020)  Unknown BL Hgb. Chronic anemia since age 14. Patient presents with significant anemia in the absence of overt GI bleeding.  Differential diagnosis remains broad and includes thalassemia vs menorrhagia vs chronic anemia 2/2 gastric sleeve.  -s/p EGD (8/21)- normal esophagus; gastritis- biopsied; sleeve gastrectomy was found, characterized by healthy appearing mucosa; normal duodenal bulb and second portion of the duodenum; no endoscopic findings to explain patient's anemia.  -s/p c-scope (8/21)- internal and external hemorrhoids; no gross findings to explain patient's iron deficiency anemia; no specimens collected.  -s/p VCE (8/21)- a single area of angiodysplasia without bleeding in the small bowel of uncertain significance; no obvious findings on capsule endoscopy to explain patient's iron deficiency anemia; capsule endoscope found in the colon at the end of the study.     #internal and external hemorrhoids  #chronic intermittent constipation    Recommendations:  -trend vitals, CBC, and monitor for clinical signs of bleeding  -maintain active type and screen  -transfusion goal to maintain hemoglobin >/= 7.0  -appreciate GYN input re: menorrhagia  -appreciate hematology input into eval for other causes of microcytic anemia  -consider IV iron infusion  -f/u path results from EGD (8/21)  -avoid NSAIDs    OK to discharge from GI standpoint. We will sign off at this time. Please reconsult/page if questions.      **THIS NOTE IS NOT FINALIZED UNTIL SIGNED BY THE ATTENDING**    Sherry Ledezma MD  GI Fellow, PGY-6  Available via Microsoft Teams    NON-URGENT CONSULTS:  Please email [email or web address]  [email or web address]  AT NIGHT AND ON WEEKENDS:  Contact on-call GI fellow via answering service ([phone]) from 5pm-8am and on weekends/holidays  MONDAY-FRIDAY 8AM-5PM:  Pager# [phone] (BENNY) or [phone] ([facility])   48yo Hungarian speaking F with PMH chronic anemia (dx at age 14; on iron supplements), chronic intermittent constipation, gastric sleeve (done 2020; with 100 lb weight loss) who presents to the ED after being referred to the ED for acute worsening of chronic anemia. Reports 1 month of fatigue, drowsiness, shortness of breath; had OP blood work done by PCP and was told to come to the ED for further eval. Report intermittent vomiting 2/2 known food triggers such as fruits, watermelon, fatty/greasy foods; last vomited yesterday after eating watermelon. Has also been reporting a vulvar lesion of x1 week duration. Reports history of heavy vaginal bleeding with menses- changes pads 5-7x/day during first few days of menses which she states is bloody with dark clots. Reports chronic nightly NSAID use (advil) few times per week that she takes to help her sleep when she has migraines lasting several days. Reports epigastric abd pain x 1 month. GI consulted for anemia w/o overt GIB.    #acute on chronic anemia  #microcytic anemia  #h/o gastric sleeve (2020)  Unknown BL Hgb. Chronic anemia since age 14. Patient presents with significant anemia in the absence of overt GI bleeding.  Differential diagnosis remains broad and includes thalassemia vs menorrhagia vs chronic anemia 2/2 gastric sleeve.  -s/p EGD (8/21)- normal esophagus; gastritis- biopsied; sleeve gastrectomy was found, characterized by healthy appearing mucosa; normal duodenal bulb and second portion of the duodenum; no endoscopic findings to explain patient's anemia.  -s/p c-scope (8/21)- internal and external hemorrhoids; no gross findings to explain patient's iron deficiency anemia; no specimens collected.  -s/p VCE (8/21)- a single red spot without bleeding in the small bowel of uncertain significance; no obvious findings on capsule endoscopy to explain patient's iron deficiency anemia; capsule endoscope found in the colon at the end of the study.     #internal and external hemorrhoids  #chronic intermittent constipation    Recommendations:  -trend vitals, CBC, and monitor for clinical signs of bleeding  -maintain active type and screen  -transfusion goal to maintain hemoglobin >/= 7.0  -appreciate GYN input re: menorrhagia  -appreciate hematology input into eval for other causes of microcytic anemia  -consider IV iron infusion  -f/u path results from EGD (8/21)  -avoid NSAIDs    OK to discharge from GI standpoint. We will sign off at this time. Please reconsult/page if questions.      **THIS NOTE IS NOT FINALIZED UNTIL SIGNED BY THE ATTENDING**    Sherry Ledezma MD  GI Fellow, PGY-6  Available via [employer] Teams    NON-URGENT CONSULTS:  Please email [email or web address]  AT NIGHT AND ON WEEKENDS:  Contact on-call GI fellow via answering service ([phone]) from 5pm-8am and on weekends/holidays  MONDAY-FRIDAY 8AM-5PM:  Pager# [phone] (BENNY) or [phone] ([facility])   48yo Belarusian speaking F with PMH chronic anemia (dx at age 14; on iron supplements), chronic intermittent constipation, gastric sleeve (done 2020; with 100 lb weight loss) who presents to the ED after being referred to the ED for acute worsening of chronic anemia. Reports 1 month of fatigue, drowsiness, shortness of breath; had OP blood work done by PCP and was told to come to the ED for further eval. Report intermittent vomiting 2/2 known food triggers such as fruits, watermelon, fatty/greasy foods; last vomited yesterday after eating watermelon. Has also been reporting a vulvar lesion of x1 week duration. Reports history of heavy vaginal bleeding with menses- changes pads 5-7x/day during first few days of menses which she states is bloody with dark clots. Reports chronic nightly NSAID use (advil) few times per week that she takes to help her sleep when she has migraines lasting several days. Reports epigastric abd pain x 1 month. GI consulted for anemia w/o overt GIB.    #acute on chronic anemia  #microcytic anemia  #h/o gastric sleeve (2020)  Unknown BL Hgb. Chronic anemia since age 14. Patient presents with significant anemia in the absence of overt GI bleeding.  Differential diagnosis remains broad and includes thalassemia vs menorrhagia vs chronic anemia 2/2 gastric sleeve.  -s/p EGD (8/21)- normal esophagus; gastritis- biopsied; sleeve gastrectomy was found, characterized by healthy appearing mucosa; normal duodenal bulb and second portion of the duodenum; no endoscopic findings to explain patient's anemia.  -s/p c-scope (8/21)- internal and external hemorrhoids; no gross findings to explain patient's iron deficiency anemia; no specimens collected.  -s/p VCE (8/21)- a single red spot without bleeding in the small bowel of uncertain significance; no obvious findings on capsule endoscopy to explain patient's iron deficiency anemia; capsule endoscope found in the colon at the end of the study.     #internal and external hemorrhoids  #chronic intermittent constipation    Recommendations:  -trend vitals, CBC, and monitor for clinical signs of bleeding  -maintain active type and screen  -transfusion goal to maintain hemoglobin >/= 7.0  -appreciate GYN input re: menorrhagia  -appreciate hematology input into eval for other causes of microcytic anemia  -consider IV iron infusion  -f/u path results from EGD (8/21)  -avoid NSAIDs    OK to discharge from GI standpoint. We will sign off at this time. Please reconsult/page if questions.      **THIS NOTE IS NOT FINALIZED UNTIL SIGNED BY THE ATTENDING**    Sherry Ledezma MD  GI Fellow, PGY-6  Available via [employer] Teams    NON-URGENT CONSULTS:  Please email [email or web address]  AT NIGHT AND ON WEEKENDS:  Contact on-call GI fellow via answering service ([phone]) from 5pm-8am and on weekends/holidays  MONDAY-FRIDAY 8AM-5PM:  Pager# [phone] (BENNY) or [phone] ([facility])   48yo Albanian speaking F with PMH chronic anemia (dx at age 14; on iron supplements), chronic intermittent constipation, gastric sleeve (done 2020; with 100 lb weight loss) who presents to the ED after being referred to the ED for acute worsening of chronic anemia. Reports 1 month of fatigue, drowsiness, shortness of breath; had OP blood work done by PCP and was told to come to the ED for further eval. Report intermittent vomiting 2/2 known food triggers such as fruits, watermelon, fatty/greasy foods; last vomited yesterday after eating watermelon. Has also been reporting a vulvar lesion of x1 week duration. Reports history of heavy vaginal bleeding with menses- changes pads 5-7x/day during first few days of menses which she states is bloody with dark clots. Reports chronic nightly NSAID use (advil) few times per week that she takes to help her sleep when she has migraines lasting several days. Reports epigastric abd pain x 1 month. GI consulted for anemia w/o overt GIB.    #acute on chronic anemia  #microcytic anemia  #h/o gastric sleeve (2020)  Unknown BL Hgb. Chronic anemia since age 14. Patient presents with significant anemia in the absence of overt GI bleeding.  Differential diagnosis remains broad and includes thalassemia vs menorrhagia vs chronic anemia 2/2 gastric sleeve.  -s/p EGD (8/21)- normal esophagus; gastritis- biopsied; sleeve gastrectomy was found, characterized by healthy appearing mucosa; normal duodenal bulb and second portion of the duodenum; no endoscopic findings to explain patient's anemia.  -s/p c-scope (8/21)- internal and external hemorrhoids; no gross findings to explain patient's iron deficiency anemia; no specimens collected.  -s/p VCE (8/21)- a single red spot without bleeding in the small bowel of uncertain significance; no obvious findings on capsule endoscopy to explain patient's iron deficiency anemia; capsule endoscope found in the colon at the end of the study.     #internal and external hemorrhoids  #chronic intermittent constipation    Recommendations:  -trend vitals, CBC, and monitor for clinical signs of bleeding  -maintain active type and screen  -transfusion goal to maintain hemoglobin >/= 7.0  -appreciate GYN input re: menorrhagia  -appreciate hematology input into eval for other causes of microcytic anemia  -consider IV iron infusion  -f/u path results from EGD (8/21)  -avoid NSAIDs    OK to discharge from GI standpoint. We will sign off at this time. Please reconsult/page if questions.      **THIS NOTE IS NOT FINALIZED UNTIL SIGNED BY THE ATTENDING**    Sherry Ledezma MD  GI Fellow, PGY-6  Available via [employer] Teams    NON-URGENT CONSULTS:  Please email [email or web address]  AT NIGHT AND ON WEEKENDS:  Contact on-call GI fellow via answering service ([phone]) from 5pm-8am and on weekends/holidays  MONDAY-FRIDAY 8AM-5PM:  Pager# [phone] (BENNY) or [phone] ([facility])

## 2023-08-22 NOTE — DISCHARGE NOTE PROVIDER - NPI NUMBER (FOR SYSADMIN USE ONLY) :
[[id number]],[UNKNOWN] [[id number]],[UNKNOWN] [[id number]],[UNKNOWN] [[id number]],[UNKNOWN],[[id number]] [[id number]],[UNKNOWN],[[id number]] [[id number]],[UNKNOWN],[[id number]] [[id number]],[UNKNOWN] [[id number]],[[id number]],[UNKNOWN] [[id number]],[[id number]],[UNKNOWN]

## 2023-08-22 NOTE — PROGRESS NOTE ADULT - ATTENDING COMMENTS
Agree with above. No evidence of active GI bleeding on pan-GI endoscopy. Suspect non-GI causes of iron deficiency anemia. Would continue iron supplementation. No additional GI workup planned as inpatient.

## 2023-08-22 NOTE — DISCHARGE NOTE PROVIDER - NSRESEARCHGRANT_HIDDEN_GEN_A_CORE
Yes Anxiety    Generalized anxiety disorder (MASON) is a mental disorder. It is defined as anxiety that is not necessarily related to specific events or activities or is out of proportion to said events. Symptoms include restlessness, fatigue, difficulty concentrations, irritability and difficulty concentrating. It interferes with life functions, including relationships, work, and school. If you were started on a medication make sure to take exactly as prescribed and follow up with a psychiatrist.    SEEK IMMEDIATE MEDICAL CARE IF YOU HAVE THE FOLLOWING SYMPTOMS: thoughts about hurting killing yourself, thoughts about hurting or killing somebody else, hallucinations, or worsening depression.

## 2023-08-22 NOTE — DISCHARGE NOTE PROVIDER - NSDCMRMEDTOKEN_GEN_ALL_CORE_FT
cephalexin 500 mg oral tablet: 1 tab(s) orally 4 times a day  cyanocobalamin 1000 mcg oral tablet: 1 tab(s) orally once a day   cyanocobalamin 1000 mcg oral tablet: 1 tab(s) orally once a day  ferrous sulfate 325 mg (65 mg elemental iron) oral tablet: 1 tab(s) orally once a day  polyethylene glycol 3350 oral powder for reconstitution: 17 gram(s) orally 2 times a day as needed for  constipation  senna leaf extract oral tablet: 2 tab(s) orally once a day (at bedtime) HOLD FOR LOOSE STOOLS.  sulfamethoxazole-trimethoprim 800 mg-160 mg oral tablet: 1 tab(s) orally 2 times a day

## 2023-08-22 NOTE — PROGRESS NOTE ADULT - SUBJECTIVE AND OBJECTIVE BOX
HPI:  F with PMH chronic anemia (dx at age 14; on iron supplements), chronic intermittent constipation, gastric sleeve p/w  worsening of chronic anemia. Reports 1 month of fatigue, drowsiness, shortness of breath; had OP blood work done by PCP and was told to come to the ED for further eval. Report intermittent vomiting 2/2 known food triggers such as fruits, watermelon, fatty/greasy foods; last vomited yesterday after eating watermelon  (18 Aug 2023 16:27)    PAST MEDICAL & SURGICAL HISTORY:    Allergies    No Known Allergies    Intolerances      Social History:    Medications:  ferrous    sulfate 325 milliGRAM(s) Oral daily  polyethylene glycol 3350 17 Gram(s) Oral two times a day  senna 2 Tablet(s) Oral at bedtime  trimethoprim  160 mG/sulfamethoxazole 800 mG 1 Tablet(s) Oral two times a day    Labs:  CBC Full  -  ( 22 Aug 2023 06:38 )  WBC Count : 5.82 K/uL  RBC Count : 3.89 M/uL  Hemoglobin : 7.1 g/dL  Hematocrit : 25.4 %  Platelet Count - Automated : 208 K/uL  Mean Cell Volume : 65.3 fl  Mean Cell Hemoglobin : 18.3 pg  Mean Cell Hemoglobin Concentration : 28.0 gm/dL  Auto Neutrophil # : x  Auto Lymphocyte # : x  Auto Monocyte # : x  Auto Eosinophil # : x  Auto Basophil # : x  Auto Neutrophil % : x  Auto Lymphocyte % : x  Auto Monocyte % : x  Auto Eosinophil % : x  Auto Basophil % : x    08-21    142  |  106  |  11  ----------------------------<  80  4.2   |  20<L>  |  0.72    Ca    8.8      21 Aug 2023 07:46  Phos  3.8     08-21  Mg     2.2     08-21        Radiology:             ROS:  Patient comfortable without distress  No SOB or chest pain  No palpitation  No abdominal pain, diarrhaea or constipation  No weakness of extremities  No skin changes or swelling of legs  Rest of the comprehensive ROS was negative  Vital Signs Last 24 Hrs  T(C): 36.8 (22 Aug 2023 09:05), Max: 37.3 (22 Aug 2023 04:57)  T(F): 98.3 (22 Aug 2023 09:05), Max: 99.1 (22 Aug 2023 04:57)  HR: 69 (22 Aug 2023 09:05) (54 - 69)  BP: 119/74 (22 Aug 2023 09:05) (94/59 - 130/70)  BP(mean): --  RR: 16 (22 Aug 2023 09:05) (16 - 18)  SpO2: 99% (22 Aug 2023 09:05) (98% - 100%)    Parameters below as of 22 Aug 2023 09:05  Patient On (Oxygen Delivery Method): room air        Physical exam:  Patient alert and oriented  No distress  CVS: S1, S2 regular or murmur  Chest: bilateral breath sound without rales  Abdomen: soft, not tender, no organomegaly or masses  CNS: No focal neuro deficit  Musculoskeletal:  Normal range of motion  Skin: No rash    Assessment and Plan: HPI:  Patient with PMH chronic anemia (dx at age 14; on iron supplements), chronic intermittent constipation, gastric sleeve p/w  worsening of chronic anemia. Reported 1 month of fatigue, drowsiness, shortness of breath; had OP blood work done by PCP and was told to come to the ED for further eval. Report intermittent vomiting 2/2 known food triggers such as fruits, watermelon, fatty/greasy foods    PAST MEDICAL & SURGICAL HISTORY:    Allergies    No Known Allergies    Intolerances      Social History:    Medications:  ferrous    sulfate 325 milliGRAM(s) Oral daily  polyethylene glycol 3350 17 Gram(s) Oral two times a day  senna 2 Tablet(s) Oral at bedtime  trimethoprim  160 mG/sulfamethoxazole 800 mG 1 Tablet(s) Oral two times a day    Labs:  CBC Full  -  ( 22 Aug 2023 06:38 )  WBC Count : 5.82 K/uL  RBC Count : 3.89 M/uL  Hemoglobin : 7.1 g/dL  Hematocrit : 25.4 %  Platelet Count - Automated : 208 K/uL  Mean Cell Volume : 65.3 fl  Mean Cell Hemoglobin : 18.3 pg  Mean Cell Hemoglobin Concentration : 28.0 gm/dL  Auto Neutrophil # : x  Auto Lymphocyte # : x  Auto Monocyte # : x  Auto Eosinophil # : x  Auto Basophil # : x  Auto Neutrophil % : x  Auto Lymphocyte % : x  Auto Monocyte % : x  Auto Eosinophil % : x  Auto Basophil % : x  Reticulocyte Percent: 0.7 % (08.19.23 @ 07:23)   Ferritin: 7 ng/mL (08.19.23 @ 07:22)     Vitamin B12, Serum: 311 pg/mL (08.19.23 @ 07:22) < from: Colonoscopy (08.21.23 @ 10:01) >  Impression:          - Hemorrhoids found on perianal exam.                       - Non-bleeding internal hemorrhoids.                       - No gross findings to explain patient's iron deficiency anemia.                       - No specimens collected.  Recommendation:      - NPO.                       - Perform a video capsule endoscopy today.    < end of copied text >  < from: Capsule Endoscopy (08.22.23 @ 12:47) >  Impression:          - No active bleeding in the small bowel.                       - Focal small red spot without bleeding, more likely prominent blood vessel                        rather than angioectasia.                       - No obvious findings on capsule endoscopy to explain patient's iron                        deficiency anemia.            - Capsule was in the colon at the end of the study.    < end of copied text >  < from: Upper Endoscopy (08.21.23 @ 09:59) >  Impression:          - Normal esophagus.                       - Gastritis. Biopsied.                       - A sleeve gastrectomy was found, characterized by healthy appearing mucosa.                       - Normal duodenal bulb and second portion of the duodenum.                       - No endoscopic findings to explain patient's anemia.    < end of copied text >    08-21    142  |  106  |  11  ----------------------------<  80  4.2   |  20<L>  |  0.72    Ca    8.8      21 Aug 2023 07:46  Phos  3.8     08-21  Mg     2.2     08-21        Radiology:     Impression:          - Hemorrhoids found on perianal exam.                       - Non-bleeding internal hemorrhoids.                       - No gross findings to explain patient's iron deficiency anemia.                       - No specimens collected.  Recommendation:      - NPO.                       - Perform a video capsule endoscopy today.    < end of copied text >  < from: Capsule Endoscopy (08.22.23 @ 12:47) >  Impression:          - No active bleeding in the small bowel.                       - Focal small red spot without bleeding, more likely prominent blood vessel                        rather than angioectasia.                       - No obvious findings on capsule endoscopy to explain patient's iron                        deficiency anemia.            - Capsule was in the colon at the end of the study.    < end of copied text >  < from: Upper Endoscopy (08.21.23 @ 09:59) >  Impression:          - Normal esophagus.                       - Gastritis. Biopsied.                       - A sleeve gastrectomy was found, characterized by healthy appearing mucosa.                       - Normal duodenal bulb and second portion of the duodenum.                       - No endoscopic findings to explain patient's anemia.    < end of copied text >          ROS:  Patient comfortable without distress  No SOB or chest pain  No palpitation  No abdominal pain, diarrhaea or constipation  No weakness of extremities  No skin changes or swelling of legs  Rest of the comprehensive ROS was negative  Vital Signs Last 24 Hrs  T(C): 36.8 (22 Aug 2023 09:05), Max: 37.3 (22 Aug 2023 04:57)  T(F): 98.3 (22 Aug 2023 09:05), Max: 99.1 (22 Aug 2023 04:57)  HR: 69 (22 Aug 2023 09:05) (54 - 69)  BP: 119/74 (22 Aug 2023 09:05) (94/59 - 130/70)  BP(mean): --  RR: 16 (22 Aug 2023 09:05) (16 - 18)  SpO2: 99% (22 Aug 2023 09:05) (98% - 100%)    Parameters below as of 22 Aug 2023 09:05  Patient On (Oxygen Delivery Method): room air        Physical exam:  Patient alert and oriented  No distress  CVS: S1, S2   Chest: bilateral breath sound without rales  Abdomen: soft, not tender, no organomegaly or masses  CNS: No focal neuro deficit  Musculoskeletal:  Normal range of motion  Skin: No rash    Assessment and Plan:

## 2023-08-22 NOTE — DISCHARGE NOTE NURSING/CASE MANAGEMENT/SOCIAL WORK - NSDCPEFALRISK_GEN_ALL_CORE
For information on Fall & Injury Prevention, visit: [email or web address].[email or web address]/news/fall-prevention-protects-and-maintains-health-and-mobility OR  https://www.[facility]/news/fall-prevention-tips-to-avoid-injury OR  https://www.cdc.gov/steadi/patient.html For information on Fall & Injury Prevention, visit: https://www.[facility]/news/fall-prevention-protects-and-maintains-health-and-mobility OR  https://www.[facility]/news/fall-prevention-tips-to-avoid-injury OR  https://www.cdc.gov/steadi/patient.html For information on Fall & Injury Prevention, visit: https://www.[facility]/news/fall-prevention-protects-and-maintains-health-and-mobility OR  https://www.[facility]/news/fall-prevention-tips-to-avoid-injury OR  https://www.cdc.gov/steadi/patient.html

## 2023-08-22 NOTE — DISCHARGE NOTE PROVIDER - NSDCFUADDAPPT_GEN_ALL_CORE_FT
APPTS ARE READY TO BE MADE: [x ] YES    Best Family or Patient Contact (if needed):    Additional Information about above appointments (if needed):    1:   2:   3:     Other comments or requests:    APPTS ARE READY TO BE MADE: [x ] YES    Best Family or Patient Contact (if needed):    Additional Information about above appointments (if needed):    1:   2:   3:     Other comments or requests:     Outreached on 8/24, 8/25, and 8/28, which have been unsuccessful. Will await call back from patient/caregiver to coordinate follow up care.

## 2023-08-22 NOTE — DISCHARGE NOTE PROVIDER - CARE PROVIDERS DIRECT ADDRESSES
,[email or web address],DirectAddress_Unknown ,[email or web address],DirectAddress_Unknown ,[email or web address],DirectAddress_Unknown ,[email or web address],DirectAddress_Unknown,DirectAddress_Unknown ,[email or web address],DirectAddress_Unknown,DirectAddress_Unknown ,[email or web address],DirectAddress_Unknown,DirectAddress_Unknown

## 2023-08-22 NOTE — DISCHARGE NOTE PROVIDER - CARE PROVIDER_API CALL
Chicho Feldman  Gastroenterology  [address] [place] [address], [address] [place] [address]  Phone: [phone]  Fax: [phone]  Follow Up Time:     aydee english  [phone]  Phone: (   )    -  Fax: (   )    -  Follow Up Time:    Chicho Feldman  Gastroenterology  [address] [place] [address], [address] [place] [address]  Phone: [phone]  Fax: [phone]  Follow Up Time:     aydee english  [phone]  Phone: (   )    -  Fax: (   )    -  Follow Up Time:    Chicho Feldman  Gastroenterology  [address] [place] [address], [address] [place] [address]  Phone: [phone]  Fax: [phone]  Follow Up Time:     aydee english  [phone]  Phone: (   )    -  Fax: (   )    -  Follow Up Time:    Chicho Feldman  Gastroenterology  [address] [place] [address], [address] [place] [address]  Phone: [phone]  Fax: [phone]  Follow Up Time:     aydee english  [phone]  Phone: (   )    -  Fax: (   )    -  Follow Up Time:     Vero Shah  Hematology  [address] [facility], [address]  [place] [address]  Phone: [phone]  Fax: [phone]  Follow Up Time:    Chicho Feldman  Gastroenterology  [address] [place] [address], [address] [place] [id number]  Phone: [phone]  Fax: [phone]  Follow Up Time:     aydee english  [phone]  Phone: (   )    -  Fax: (   )    -  Follow Up Time:     Vero Shah  Hematology  [address] [facility], [address]  [place] [address]  Phone: [phone]  Fax: [phone]  Follow Up Time:    Chicho Feldman  Gastroenterology  [address] [place] [address], [address] [place] [address]  Phone: [phone]  Fax: [phone]  Follow Up Time:     aydee english  [phone]  Phone: (   )    -  Fax: (   )    -  Follow Up Time:     Vero Shah  Hematology  [address] [facility], [address]  [place] [address]  Phone: [phone]  Fax: [phone]  Follow Up Time:    Chicho Feldman  Gastroenterology  [address] [place] [address], [address] [place] [address]  Phone: [phone]  Fax: [phone]  Follow Up Time: 1 week    Vero Shah  Hematology  [address] [place] [address], [address]  [place] [address]  Phone: [phone]  Fax: [phone]  Follow Up Time: 1 week    aydee english  [phone]  Phone: (   )    -  Fax: (   )    -  Established Patient  Follow Up Time: 1 week   Chicho Feldman  Gastroenterology  [address] [place] [address], [address] [place] [address]  Phone: [phone]  Fax: [phone]  Follow Up Time: 1 week    Vero Shah  Hematology  [address] [place] [address], [address]  [place] [address]  Phone: [phone]  Fax: [phone]  Follow Up Time: 1 week    aydee english  [phone]  Phone: (   )    -  Fax: (   )    -  Established Patient  Follow Up Time: 1 week   Chicho Feldman  Gastroenterology  [address] [place] [address], [address] [place] [address]  Phone: [phone]  Fax: [phone]  Follow Up Time: 1 week    Vero Shah  Hematology  [address] [place] [address], [address]  [place] [address]  Phone: [phone]  Fax: [phone]  Follow Up Time: 1 week    aydee english  [phone]  Phone: (   )    -  Fax: (   )    -  Established Patient  Follow Up Time: 1 week

## 2023-08-23 LAB
SURGICAL PATHOLOGY STUDY: SIGNIFICANT CHANGE UP

## 2023-08-24 LAB
CULTURE RESULTS: SIGNIFICANT CHANGE UP
SPECIMEN SOURCE: SIGNIFICANT CHANGE UP

## 2023-08-24 NOTE — CHART NOTE - NSCHARTNOTEFT_GEN_A_CORE
1 attempt(s) were made to reach patient, which have been unsuccessful. Unable to leave voicemail on 8/24

## 2023-08-25 NOTE — CHART NOTE - NSCHARTNOTESELECT_GEN_ALL_CORE
Event Note
PO ABX/Event Note
d/c appt/Event Note
fever/Event Note
Event Note
d/c appt/Event Note
Event Note
GI Fellow/Off Service Note
VCE/Event Note

## 2023-09-06 DIAGNOSIS — B96.81 GASTRITIS, UNSPECIFIED, W/OUT BLEEDING: ICD-10-CM

## 2023-09-06 DIAGNOSIS — K29.70 GASTRITIS, UNSPECIFIED, W/OUT BLEEDING: ICD-10-CM

## 2023-09-06 PROBLEM — Z00.00 ENCOUNTER FOR PREVENTIVE HEALTH EXAMINATION: Status: ACTIVE | Noted: 2023-09-06

## 2023-09-06 RX ORDER — OMEPRAZOLE 20 MG/1
20 CAPSULE, DELAYED RELEASE ORAL TWICE DAILY
Qty: 28 | Refills: 0 | Status: ACTIVE | COMMUNITY
Start: 2023-09-06 | End: 1900-01-01

## 2023-09-06 RX ORDER — AMOXICILLIN 500 MG/1
500 TABLET, FILM COATED ORAL
Qty: 56 | Refills: 0 | Status: ACTIVE | COMMUNITY
Start: 2023-09-06 | End: 1900-01-01

## 2023-09-06 RX ORDER — CLARITHROMYCIN 500 MG/1
500 TABLET, FILM COATED ORAL
Qty: 28 | Refills: 0 | Status: ACTIVE | COMMUNITY
Start: 2023-09-06 | End: 1900-01-01

## 2023-11-06 ENCOUNTER — APPOINTMENT (OUTPATIENT)
Dept: GASTROENTEROLOGY | Facility: CLINIC | Age: 48
End: 2023-11-06

## 2023-12-20 ENCOUNTER — APPOINTMENT (OUTPATIENT)
Dept: GASTROENTEROLOGY | Facility: CLINIC | Age: 48
End: 2023-12-20

## 2023-12-21 RX ORDER — PREGABALIN 225 MG/1
1 CAPSULE ORAL
Refills: 0 | DISCHARGE

## 2024-11-11 NOTE — PATIENT PROFILE ADULT - FALL HARM RISK - FACTORS NURSING JUDGEMENT
Health Maintenance       Pneumococcal Vaccine 0-64 (2 of 2 - PCV)  Overdue since 3/29/2020    Influenza Vaccine (1)  Overdue since 9/1/2024    COVID-19 Vaccine (3 - 2024-25 season)  Overdue since 9/1/2024    Depression Screening (Yearly)  Due soon on 12/12/2024           Following review of the above:  Patient wishes to discuss with clinician: COVID-19, Influenza, and Pneumococcal    Note: Refer to final orders and clinician documentation.      The recording was initiated after a verbal consent was obtained from the patient to record this visit for documentation in their clinical record.  Advance Directives:  not discussed     Yes

## (undated) DEVICE — TUBING IV SET GRAVITY 3Y 100" MACRO

## (undated) DEVICE — BIOPSY FORCEP RADIAL JAW 4 STANDARD WITH NEEDLE

## (undated) DEVICE — SUCTION YANKAUER NO CONTROL VENT

## (undated) DEVICE — CATH IV SAFE BC 22G X 1" (BLUE)

## (undated) DEVICE — PACK IV START WITH CHG

## (undated) DEVICE — BITE BLOCK ADULT 20 X 27MM (GREEN)

## (undated) DEVICE — SOL INJ NS 0.9% 500ML 2 PORT

## (undated) DEVICE — SYR ALLIANCE II INFLATION 60ML

## (undated) DEVICE — FOLEY HOLDER STATLOCK 2 WAY ADULT

## (undated) DEVICE — CATH IV SAFE BC 20G X 1.16" (PINK)

## (undated) DEVICE — TUBING SUCTION CONN 6FT STERILE

## (undated) DEVICE — SENSOR O2 FINGER ADULT

## (undated) DEVICE — TUBING SUCTION 20FT

## (undated) DEVICE — BALLOON US ENDO